# Patient Record
Sex: MALE | Race: WHITE | NOT HISPANIC OR LATINO | Employment: FULL TIME | URBAN - METROPOLITAN AREA
[De-identification: names, ages, dates, MRNs, and addresses within clinical notes are randomized per-mention and may not be internally consistent; named-entity substitution may affect disease eponyms.]

---

## 2018-12-06 NOTE — PROGRESS NOTES
SUBJECTIVE:   CC: Robson Samuel is an 28 year old male who presents for preventive health visit. He is a new patient to our clinic and has not been receiving regular care. His last CPE was many years ago. He has the following concerns they would like addressed today:    Jj is a new patient to us.  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  Life is going well for him and he is happy. His mom is a nephrologist in [place].    1. Jj requests routine STD screening. He has no concerns or symptoms and would like screening.       Healthy Habits:    Do you get at least three servings of calcium containing foods daily (dairy, green leafy vegetables, etc.)? yes    Amount of exercise or daily activities, outside of work: daily yoga for 60 minutes.  Teaches yoga sometimes twice daily.    Problems taking medications regularly not applicable    Medication side effects: No    Have you had an eye exam in the past two years? yes    Do you see a dentist twice per year? yes    Do you have sleep apnea, excessive snoring or daytime drowsiness?no    Today's PHQ-2 Score:   PHQ-2 ( 1999 Pfizer) 12/11/2018   Q1: Little interest or pleasure in doing things 0   Q2: Feeling down, depressed or hopeless 0   PHQ-2 Score 0       There are no active problems to display for this patient.      Past Medical History:   Diagnosis Date     Syncope     One episode       Past Surgical History:   Procedure Laterality Date     wisdom teeth         Family History   Problem Relation Age of Onset     Multiple Sclerosis Mother      Asthma Sister      Heart block Maternal Grandfather         pacemaker     Social History     Socioeconomic History     Marital status: Single     Spouse name: None     Number of children: None     Years of education: None     Highest education level: None   Social Needs     Financial resource strain: None     Food insecurity - worry: None     Food insecurity - inability: None     Transportation needs - medical:  No     Transportation needs - non-medical: No   Occupational History     Occupation: assistant [employer]     Comment: [employer]     Occupation: Teacher     Comment: yoga   Tobacco Use     Smoking status: Never Smoker     Smokeless tobacco: Never Used   Substance and Sexual Activity     Alcohol use: Yes     Alcohol/week: 12.0 oz     Types: 20 Standard drinks or equivalent per week     Frequency: 4 or more times a week     Drinks per session: 3 or 4     Binge frequency: Monthly     Drug use: Yes     Types: Marijuana     Comment: Once per month     Sexual activity: Yes     Partners: Female     Birth control/protection: Condom   Other Topics Concern     None   Social History Narrative    Lives with friend and [place] lopes puppy.    Life is generally going well.        Has a good support system. From [place].  Family is in [place] and supportive.    Feels safe in all environments.     Wears seatbelt 100% of the time.    Wears helmet while biking.    Denies history of abuse, past or present, physical, sexual or emotional.    12/11/18    Zonia Charles PA-C                 No current outpatient medications on file.       Reviewed orders with patient. Reviewed health maintenance and updated orders accordingly - Yes      Reviewed and updated as needed this visit by clinical staff  Tobacco  Allergies  Meds  Problems  Med Hx  Surg Hx  Fam Hx  Soc Hx          Reviewed and updated as needed this visit by Provider  Tobacco  Allergies  Meds  Problems  Med Hx  Surg Hx  Fam Hx  Soc Hx             ROS:  CONSTITUTIONAL: NEGATIVE for fever, chills, change in weight  INTEGUMENTARY/SKIN: NEGATIVE for worrisome rashes, moles or lesions  EYES: NEGATIVE for vision changes or irritation  ENT: NEGATIVE for ear, mouth and throat problems  RESP: NEGATIVE for significant cough or SOB  CV: NEGATIVE for chest pain, palpitations or peripheral edema  GI: NEGATIVE for nausea, abdominal pain, heartburn, or change in bowel habits   male:  "negative for dysuria, hematuria, decreased urinary stream, erectile dysfunction, urethral discharge  MUSCULOSKELETAL: NEGATIVE for significant arthralgias or myalgia  NEURO: NEGATIVE for weakness, dizziness or paresthesias  PSYCHIATRIC: NEGATIVE for changes in mood or affect    OBJECTIVE:   /83 (BP Location: Right arm, Patient Position: Chair, Cuff Size: Adult Regular)   Pulse 71   Temp 97.9  F (36.6  C) (Oral)   Ht 1.803 m (5' 11\")   Wt 79.9 kg (176 lb 1.3 oz)   SpO2 96%   BMI 24.56 kg/m    EXAM:  GENERAL: healthy, alert and no distress  EYES: Eyes grossly normal to inspection, PERRL and conjunctivae and sclerae normal  HENT: ear canals and TM's normal, nose and mouth without ulcers or lesions  NECK: no adenopathy, no asymmetry, masses, or scars and thyroid normal to palpation. No bruits.   RESP: lungs clear to auscultation - no rales, rhonchi or wheezes  CV: regular rate and rhythm, normal S1 S2, no S3 or S4, no murmur, click or rub, no peripheral edema and peripheral pulses strong  ABDOMEN: soft, nontender, no hepatosplenomegaly, no masses and bowel sounds normal  MS: no gross musculoskeletal defects noted, no edema. no clubbing, edema or cyanosis of extremities. Pulses = and appropriate bilaterally to DP and PT  SKIN: no suspicious lesions or rashes  NEURO: Normal strength and tone, mentation intact and speech normal  PSYCH: mentation appears normal, affect normal/bright      ASSESSMENT/PLAN:       ICD-10-CM    1. Routine general medical examination at a health care facility Z00.00 Lipid Panel ([facility])   2. Screening for lipid disorders Z13.220 Lipid Panel ([facility])   3. Need for Tdap vaccination Z23 TDAP VACCINE (BOOSTRIX)     VACCINE ADMINISTRATION, INITIAL   4. Flu vaccine need Z23 C RIV4 (FLUBLOK) VACCINE RECOMBINANT DNA PRSRV ANTIBIO FREE, IM     VACCINE ADMINISTRATION, EACH ADDITIONAL   5. Screen for STD (sexually transmitted disease) Z11.3 HIV Antigen Antibody Combo     Chlamydia trachomatis " "PCR     Neisseria gonorrhoeae PCR     Treponema Abs w Reflex to RPR and Titer         Generally doing very well.  All questions answered.  RTC for CPE in 2 years.  Sooner as needed.  COUNSELING:  Reviewed preventive health counseling, as reflected in patient instructions  Special attention given to:        Regular exercise       Healthy diet/nutrition       Vision screening       Immunizations    Vaccinated for: Influenza and TDAP         Alcohol Use       Safe sex practices/STD prevention       Osteoporosis Prevention/Bone Health    BP Readings from Last 1 Encounters:   12/11/18 131/83     Estimated body mass index is 24.56 kg/m  as calculated from the following:    Height as of this encounter: 1.803 m (5' 11\").    Weight as of this encounter: 79.9 kg (176 lb 1.3 oz).     reports that  has never smoked. he has never used smokeless tobacco.      Counseling Resources:  ATP IV Guidelines  Pooled Cohorts Equation Calculator  FRAX Risk Assessment  ICSI Preventive Guidelines  Dietary Guidelines for Americans, 2010  USDA's MyPlate  ASA Prophylaxis  Lung CA Screening    Steph Charles PA-C  [facility]    I, Yarelis Champagne, am serving as a scribe to document services personally performed by Steph Charles PA-C, based on data collection and the provider's statements to me. Steph Charles PA-C, has reviewed, edited, and approve the above note.     "

## 2018-12-06 NOTE — PATIENT INSTRUCTIONS
Welcome to [facility], where we are committed to the art of inspired primary care.  Thank you for choosing us to be a part of your well-being.    The clinic is open Monday through Friday, 8:00 a.m. - 5:00 p.m., and Saturdays from 8:00 a.m. - 12:00 p.m.  After-hours questions are directed to our 24-hour nurse line, which can be reached by calling the clinic at [phone].  You can also contact the clinic through [facility]ie, our online patient portal.  (Please allow 1-2 business days for a response via Hunie.)  If you are not already enrolled in [facility], access instructions are below.    If you need a refill on your prescription, please contact the pharmacy where you filled it, and they will contact our clinic with the details of what is needed.  If your prescription is a controlled substance, you will have a conversation with your provider to determine if you would like to  your prescription at the clinic or have it mailed to your pharmacy.  Please allow 2-3 business days for all refill requests to be handled.    We look forward to providing you with great care!    Please let me know if you have any questions.  Thank you for allowing me to participate in your care.  It was my pleasure meeting you.  Zonia Charles PA-C        Preventive Health Recommendations  Male Ages 26 - 39    Yearly exam:             See your health care provider every year in order to  o   Review health changes.   o   Discuss preventive care.    o   Review your medicines if your doctor has prescribed any.    You should be tested each year for STDs (sexually transmitted diseases), if you re at risk.     After age 35, talk to your provider about cholesterol testing. If you are at risk for heart disease, have your cholesterol tested at least every 5 years.     If you are at risk for diabetes, you should have a diabetes test (fasting glucose).  Shots: Get a flu shot each year. Get a tetanus shot every 10  years.     Nutrition:    Eat at least 5 servings of fruits and vegetables daily.     Eat whole-grain bread, whole-wheat pasta and brown rice instead of white grains and rice.     Get adequate Calcium and Vitamin D.     Lifestyle    Exercise for at least 150 minutes a week (30 minutes a day, 5 days a week). This will help you control your weight and prevent disease.     Limit alcohol to one drink per day.     No smoking.     Wear sunscreen to prevent skin cancer.     See your dentist every six months for an exam and cleaning.

## 2018-12-11 ENCOUNTER — OFFICE VISIT (OUTPATIENT)
Dept: FAMILY MEDICINE | Facility: CLINIC | Age: 28
End: 2018-12-11
Payer: COMMERCIAL

## 2018-12-11 VITALS
WEIGHT: 176.08 LBS | SYSTOLIC BLOOD PRESSURE: 131 MMHG | BODY MASS INDEX: 24.65 KG/M2 | OXYGEN SATURATION: 96 % | TEMPERATURE: 97.9 F | HEART RATE: 71 BPM | DIASTOLIC BLOOD PRESSURE: 83 MMHG | HEIGHT: 71 IN

## 2018-12-11 DIAGNOSIS — Z23 FLU VACCINE NEED: ICD-10-CM

## 2018-12-11 DIAGNOSIS — Z13.220 SCREENING FOR LIPID DISORDERS: ICD-10-CM

## 2018-12-11 DIAGNOSIS — Z00.00 ROUTINE GENERAL MEDICAL EXAMINATION AT A HEALTH CARE FACILITY: Primary | ICD-10-CM

## 2018-12-11 DIAGNOSIS — Z23 NEED FOR TDAP VACCINATION: ICD-10-CM

## 2018-12-11 DIAGNOSIS — Z11.3 SCREEN FOR STD (SEXUALLY TRANSMITTED DISEASE): ICD-10-CM

## 2018-12-11 LAB
CHOLEST SERPL-MCNC: 213 MG/DL (ref 0–200)
CHOLEST/HDLC SERPL: 2.4 {RATIO} (ref 0–5)
FASTING SPECIMEN: NO
HDLC SERPL-MCNC: 88 MG/DL
LDLC SERPL CALC-MCNC: 96 MG/DL (ref 0–129)
TRIGL SERPL-MCNC: 145 MG/DL (ref 0–150)
VLDL-CHOLESTEROL: 29 (ref 7–32)

## 2018-12-11 SDOH — HEALTH STABILITY: MENTAL HEALTH: HOW MANY STANDARD DRINKS CONTAINING ALCOHOL DO YOU HAVE ON A TYPICAL DAY?: 3 OR 4

## 2018-12-11 SDOH — HEALTH STABILITY: PHYSICAL HEALTH: ON AVERAGE, HOW MANY DAYS PER WEEK DO YOU ENGAGE IN MODERATE TO STRENUOUS EXERCISE (LIKE A BRISK WALK)?: 7 DAYS

## 2018-12-11 SDOH — ECONOMIC STABILITY: TRANSPORTATION INSECURITY
IN THE PAST 12 MONTHS, HAS THE LACK OF TRANSPORTATION KEPT YOU FROM MEDICAL APPOINTMENTS OR FROM GETTING MEDICATIONS?: NO

## 2018-12-11 SDOH — HEALTH STABILITY: MENTAL HEALTH
STRESS IS WHEN SOMEONE FEELS TENSE, NERVOUS, ANXIOUS, OR CAN'T SLEEP AT NIGHT BECAUSE THEIR MIND IS TROUBLED. HOW STRESSED ARE YOU?: ONLY A LITTLE

## 2018-12-11 SDOH — HEALTH STABILITY: PHYSICAL HEALTH: ON AVERAGE, HOW MANY MINUTES DO YOU ENGAGE IN EXERCISE AT THIS LEVEL?: 60 MIN

## 2018-12-11 SDOH — HEALTH STABILITY: MENTAL HEALTH: HOW OFTEN DO YOU HAVE 6 OR MORE DRINKS ON ONE OCCASION?: MONTHLY

## 2018-12-11 SDOH — HEALTH STABILITY: MENTAL HEALTH: HOW OFTEN DO YOU HAVE A DRINK CONTAINING ALCOHOL?: 4 OR MORE TIMES A WEEK

## 2018-12-11 SDOH — ECONOMIC STABILITY: TRANSPORTATION INSECURITY
IN THE PAST 12 MONTHS, HAS LACK OF TRANSPORTATION KEPT YOU FROM MEETINGS, WORK, OR FROM GETTING THINGS NEEDED FOR DAILY LIVING?: NO

## 2018-12-11 ASSESSMENT — MIFFLIN-ST. JEOR: SCORE: 1800.82

## 2018-12-11 ASSESSMENT — PAIN SCALES - GENERAL: PAINLEVEL: NO PAIN (0)

## 2018-12-11 NOTE — NURSING NOTE
Screening Questionnaire for Adult Immunization    Are you sick today?   No   Do you have allergies to medications, food, a vaccine component or latex?   No   Have you ever had a serious reaction after receiving a vaccination?   No   Do you have a long-term health problem with heart disease, lung disease, asthma, kidney disease, metabolic disease (e.g. diabetes), anemia, or other blood disorder?   No   Do you have cancer, leukemia, HIV/AIDS, or any other immune system problem?   No   In the past 3 months, have you taken medications that affect  your immune system, such as prednisone, other steroids, or anticancer drugs; drugs for the treatment of rheumatoid arthritis, Crohn s disease, or psoriasis; or have you had radiation treatments?   No   Have you had a seizure, or a brain or other nervous system problem?   No   During the past year, have you received a transfusion of blood or blood     products, or been given immune (gamma) globulin or antiviral drug?   No   For women: Are you pregnant or is there a chance you could become        pregnant during the next month?   No   Have you received any vaccinations in the past 4 weeks?   No     Immunization questionnaire answers were all negative.        Given by Mary Lou Tsai. Patient instructed to remain in clinic for 15 minutes afterwards, and to report any adverse reaction to me immediately.       Screening performed by Mary Lou Tsai on 12/11/2018 at 9:36 AM.    Injectable Influenza Immunization Documentation    1.  Has the patient received the information for the injectable influenza vaccine? YES     2. Is the patient 6 months of age or older? YES     3. Does the patient have any of the following contraindications?         Severe allergy to eggs? No     Severe allergic reaction to previous influenza vaccines? No   Severe allergy to latex? No       History of Guillain-Greenwald syndrome? No     Currently have a temperature greater than 100.4F? No        4.  Severely  "egg allergic patients should have flu vaccine eligibility assessed by an MD, RN, or pharmacist, and those who received flu vaccine should be observed for 15 min by an MD, RN, Pharmacist, Medical Technician, or member of clinic staff.\": YES    5. Latex-allergic patients should be given latex-free influenza vaccine Yes. Please reference the Vaccine latex table to determine if your clinic s product is latex-containing.       Vaccination given by HANK Gonzales          "

## 2018-12-11 NOTE — NURSING NOTE
"28 year old  Chief Complaint   Patient presents with     [facility]     Physical     STD     screening.       Blood pressure 131/83, pulse 71, temperature 97.9  F (36.6  C), temperature source Oral, height 1.803 m (5' 11\"), weight 79.9 kg (176 lb 1.3 oz), SpO2 96 %. Body mass index is 24.56 kg/m .  There is no problem list on file for this patient.      Wt Readings from Last 2 Encounters:   12/11/18 79.9 kg (176 lb 1.3 oz)   12/31/16 74.4 kg (164 lb)     BP Readings from Last 3 Encounters:   12/11/18 131/83   12/31/16 115/72         No current outpatient medications on file.     No current facility-administered medications for this visit.        Social History     Tobacco Use     Smoking status: Never Smoker     Smokeless tobacco: Never Used   Substance Use Topics     Alcohol use: Yes     Alcohol/week: 0.0 oz     Drug use: Yes     Types: Marijuana       Health Maintenance Due   Topic Date Due     DTAP/TDAP/TD IMMUNIZATION (1 - Tdap) 02/09/1999     PHQ-2 Q1 YR  02/09/2004     HIV SCREEN (SYSTEM ASSIGNED)  02/09/2010     INFLUENZA VACCINE (1) 09/01/2018       No results found for: HELIO Vines CMA  December 11, 2018 8:05 AM  "

## 2018-12-12 LAB
C TRACH DNA SPEC QL NAA+PROBE: NEGATIVE
HIV 1+2 AB+HIV1 P24 AG SERPL QL IA: NONREACTIVE
N GONORRHOEA DNA SPEC QL NAA+PROBE: NEGATIVE
SPECIMEN SOURCE: NORMAL
SPECIMEN SOURCE: NORMAL
T PALLIDUM AB SER QL: NONREACTIVE

## 2019-12-17 NOTE — PROGRESS NOTES
SUBJECTIVE:   CC: Jj Samuel is an 29 year old male who presents for preventive health visit.     Jj is wondering if he can have his liver function tests checked today. 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 He is not concerned about alcohol abuse. Negative CAGE score today.    Elevated blood pressure without diagnosis of hypertension. His blood pressure is elevated today. No history of hypertension.     BP Readings from Last 3 Encounters:   12/18/19 (!) 146/74   12/11/18 131/83   12/31/16 115/72     Healthy Habits:    Do you get at least three servings of calcium containing foods daily (dairy, green leafy vegetables, etc.)? Yes    Amount of exercise or daily activities, outside of work: Exercises regularly and teaches yoga.     Problems taking medications regularly not applicable    Medication side effects: No    Have you had an eye exam in the past two years? yes    Do you see a dentist twice per year? yes    Do you have sleep apnea, excessive snoring or daytime drowsiness? no    Today's PHQ-2 Score:   PHQ-2 ( 1999 Pfizer) 12/18/2019 12/11/2018   Q1: Little interest or pleasure in doing things 0 0   Q2: Feeling down, depressed or hopeless 0 0   PHQ-2 Score 0 0       Social History     Tobacco Use     Smoking status: Never Smoker     Smokeless tobacco: Never Used   Substance Use Topics     Alcohol use: Yes     Alcohol/week: 16.0 standard drinks     Types: 16 Standard drinks or equivalent per week     Frequency: 4 or more times a week     Drinks per session: 3 or 4     Binge frequency: Monthly     Comment: pt drinks beer every day      If you drink alcohol do you typically have >3 drinks per day or >7 drinks per week? Yes - Negative CAGE score                     Reviewed orders with patient. Reviewed health maintenance and updated orders accordingly - Yes    Reviewed and updated as needed this visit by clinical staff  Tobacco  Allergies  Meds  Med Hx  Surg Hx  Fam Hx  Soc  "Hx        Reviewed and updated as needed this visit by Provider  Tobacco  Med Hx  Surg Hx  Fam Hx  Soc Hx       There are no active problems to display for this patient.      Past Medical History:   Diagnosis Date     Syncope     One episode       Past Surgical History:   Procedure Laterality Date     wisdom teeth         Family History   Problem Relation Age of Onset     Multiple Sclerosis Mother      Asthma Sister      Heart block Maternal Grandfather         pacemaker       Social History     Social History Narrative    Lives with friend and Portuguese lopes puppy.    Life is generally going well.        Has a good support system. From [place].  Family is in [place] and supportive.    Feels safe in all environments.     Wears seatbelt 100% of the time.    Wears helmet while biking.    Denies history of abuse, past or present, physical, sexual or emotional.    12/11/18    Zonia Charlse PA-C        Lives with roommate. Puppy is doing well.  Work is going well.    12/18/19    No change updated.               No current outpatient medications on file.       ROS:  CONSTITUTIONAL: NEGATIVE for fever, chills, change in weight  INTEGUMENTARY/SKIN: NEGATIVE for worrisome rashes, moles or lesions  EYES: NEGATIVE for vision changes or irritation  ENT: NEGATIVE for ear, mouth and throat problems  RESP: NEGATIVE for significant cough or SOB  CV: NEGATIVE for chest pain, palpitations or peripheral edema  GI: NEGATIVE for nausea, abdominal pain, heartburn, or change in bowel habits   male: negative for dysuria, hematuria, decreased urinary stream, erectile dysfunction, urethral discharge  MUSCULOSKELETAL: NEGATIVE for significant arthralgias or myalgia  NEURO: NEGATIVE for weakness, dizziness or paresthesias  PSYCHIATRIC: NEGATIVE for changes in mood or affect    OBJECTIVE:   BP (!) 146/74   Pulse 53   Temp 97.6  F (36.4  C) (Oral)   Resp 16   Ht 1.8 m (5' 10.87\")   Wt 77.8 kg (171 lb 8 oz)   SpO2 100%   BMI 24.01 kg/m  " "  EXAM:  GENERAL: healthy, alert and no distress  EYES: Eyes grossly normal to inspection, PERRL and conjunctivae and sclerae normal  HENT: ear canals and TM's normal, nose and mouth without ulcers or lesions  NECK: no adenopathy, no asymmetry, masses, or scars and thyroid normal to palpation. No carotid bruits.   RESP: lungs clear to auscultation - no rales, rhonchi or wheezes  CV: regular rate and rhythm, normal S1 S2, no S3 or S4, no murmur, click or rub, no peripheral edema and peripheral pulses strong  ABDOMEN: soft, nontender, no hepatosplenomegaly, no masses and bowel sounds normal  MS: no gross musculoskeletal defects noted, no clubbing, edema or cyanosis of extremities. Pulses = and appropriate bilaterally to DP and PT  SKIN: no suspicious lesions or rashes  NEURO: Normal strength and tone, mentation intact and speech normal  PSYCH: mentation appears normal, affect normal/bright    ASSESSMENT/PLAN:       ICD-10-CM    1. Routine general medical examination at a health care facility Z00.00    2. Screening for lipid disorders Z13.220 Lipid Panel ([place])     CANCELED: Lipid Panel ([facility])   3. Flu vaccine need Z23 C RIV4 (FLUBLOK) VACCINE RECOMBINANT DNA PRSRV ANTIBIO FREE, IM     ADMIN INFLUENZA VIRUS VACCINE   4. Alcohol use Z72.89 GGT     Hepatic panel     CANCELED: GGT     CANCELED: Hepatic panel       COUNSELING:  Reviewed preventive health counseling, as reflected in patient instructions  Special attention given to:        Regular exercise       Healthy diet/nutrition       Immunizations    Vaccinated for: Influenza         Alcohol Use       Osteoporosis Prevention/Bone Health    Estimated body mass index is 24.01 kg/m  as calculated from the following:    Height as of this encounter: 1.8 m (5' 10.87\").    Weight as of this encounter: 77.8 kg (171 lb 8 oz).     reports that he has never smoked. He has never used smokeless tobacco.    Counseling Resources:  ATP IV Guidelines  Pooled Cohorts Equation " Calculator  FRAX Risk Assessment  ICSI Preventive Guidelines  Dietary Guidelines for Americans, 2010  USDA's MyPlate  ASA Prophylaxis  Lung CA Screening    Steph Charles PA-C  [facility]    IAsad am serving as a scribe to document services personally performed by Steph Charles PA-C, based on data collection and the provider's statements to me. Steph Charles PA-C, has reviewed, edited, and approved the above note.

## 2019-12-17 NOTE — PATIENT INSTRUCTIONS
Preventive Health Recommendations  Male Ages 26 - 39    Yearly exam:             See your health care provider every year in order to  o   Review health changes.   o   Discuss preventive care.    o   Review your medicines if your doctor has prescribed any.    You should be tested each year for STDs (sexually transmitted diseases), if you re at risk.     After age 35, talk to your provider about cholesterol testing. If you are at risk for heart disease, have your cholesterol tested at least every 5 years.     If you are at risk for diabetes, you should have a diabetes test (fasting glucose).  Shots: Get a flu shot each year. Get a tetanus shot every 10 years.     Nutrition:    Eat at least 5 servings of fruits and vegetables daily.     Eat whole-grain bread, whole-wheat pasta and brown rice instead of white grains and rice.     Get adequate Calcium and Vitamin D.     Lifestyle    Exercise for at least 150 minutes a week (30 minutes a day, 5 days a week). This will help you control your weight and prevent disease.     Limit alcohol to one drink per day.     No smoking.     Wear sunscreen to prevent skin cancer.     See your dentist every six months for an exam and cleaning.   The CAGE screening questions (asking whether patients felt they should cut down on drinking, were annoyed by others criticizing his drinking, felt guilty about use, or ever had an eye opener) were asked of the patient to determine possible ETOH or chemical abuse issues.   His positive answers are as follows.    Have you ever:  C    Patient felt they ought to CUT down on your drinking (or drug use).

## 2019-12-18 ENCOUNTER — OFFICE VISIT (OUTPATIENT)
Dept: FAMILY MEDICINE | Facility: CLINIC | Age: 29
End: 2019-12-18
Payer: COMMERCIAL

## 2019-12-18 ENCOUNTER — TELEPHONE (OUTPATIENT)
Dept: FAMILY MEDICINE | Facility: CLINIC | Age: 29
End: 2019-12-18

## 2019-12-18 VITALS
BODY MASS INDEX: 24.01 KG/M2 | WEIGHT: 171.5 LBS | DIASTOLIC BLOOD PRESSURE: 74 MMHG | HEIGHT: 71 IN | RESPIRATION RATE: 16 BRPM | TEMPERATURE: 97.6 F | OXYGEN SATURATION: 100 % | SYSTOLIC BLOOD PRESSURE: 146 MMHG | HEART RATE: 53 BPM

## 2019-12-18 DIAGNOSIS — Z13.220 SCREENING FOR LIPID DISORDERS: ICD-10-CM

## 2019-12-18 DIAGNOSIS — Z78.9 ALCOHOL USE: ICD-10-CM

## 2019-12-18 DIAGNOSIS — Z23 FLU VACCINE NEED: ICD-10-CM

## 2019-12-18 DIAGNOSIS — Z00.00 ROUTINE GENERAL MEDICAL EXAMINATION AT A HEALTH CARE FACILITY: Primary | ICD-10-CM

## 2019-12-18 SDOH — SOCIAL STABILITY: SOCIAL INSECURITY
WITHIN THE LAST YEAR, HAVE TO BEEN RAPED OR FORCED TO HAVE ANY KIND OF SEXUAL ACTIVITY BY YOUR PARTNER OR EX-PARTNER?: NO

## 2019-12-18 SDOH — SOCIAL STABILITY: SOCIAL INSECURITY: WITHIN THE LAST YEAR, HAVE YOU BEEN HUMILIATED OR EMOTIONALLY ABUSED IN OTHER WAYS BY YOUR PARTNER OR EX-PARTNER?: NO

## 2019-12-18 SDOH — ECONOMIC STABILITY: INCOME INSECURITY: HOW HARD IS IT FOR YOU TO PAY FOR THE VERY BASICS LIKE FOOD, HOUSING, MEDICAL CARE, AND HEATING?: NOT HARD AT ALL

## 2019-12-18 SDOH — ECONOMIC STABILITY: FOOD INSECURITY: WITHIN THE PAST 12 MONTHS, YOU WORRIED THAT YOUR FOOD WOULD RUN OUT BEFORE YOU GOT MONEY TO BUY MORE.: NEVER TRUE

## 2019-12-18 SDOH — SOCIAL STABILITY: SOCIAL INSECURITY
WITHIN THE LAST YEAR, HAVE YOU BEEN KICKED, HIT, SLAPPED, OR OTHERWISE PHYSICALLY HURT BY YOUR PARTNER OR EX-PARTNER?: NO

## 2019-12-18 SDOH — SOCIAL STABILITY: SOCIAL INSECURITY: WITHIN THE LAST YEAR, HAVE YOU BEEN AFRAID OF YOUR PARTNER OR EX-PARTNER?: NO

## 2019-12-18 SDOH — ECONOMIC STABILITY: FOOD INSECURITY: WITHIN THE PAST 12 MONTHS, THE FOOD YOU BOUGHT JUST DIDN'T LAST AND YOU DIDN'T HAVE MONEY TO GET MORE.: NEVER TRUE

## 2019-12-18 ASSESSMENT — MIFFLIN-ST. JEOR: SCORE: 1772.92

## 2019-12-18 NOTE — TELEPHONE ENCOUNTER
Health Call Center    Phone Message    May a detailed message be left on voicemail: yes    Reason for Call: Other: Jj calling to decline the lab work that Steph Charles was asking him to do after speaking with his insurance company.  This is an FYI and he is not requesting a call back       Action Taken: Message routed to:  [facility]: [facility]

## 2019-12-18 NOTE — NURSING NOTE
"29 year old  Chief Complaint   Patient presents with     Physical     pt is wondering about having his liver tested.        Blood pressure (!) 146/74, pulse 53, temperature 97.6  F (36.4  C), temperature source Oral, resp. rate 16, height 1.8 m (5' 10.87\"), weight 77.8 kg (171 lb 8 oz), SpO2 100 %. Body mass index is 24.01 kg/m .  There is no problem list on file for this patient.      Wt Readings from Last 2 Encounters:   12/18/19 77.8 kg (171 lb 8 oz)   12/11/18 79.9 kg (176 lb 1.3 oz)     BP Readings from Last 3 Encounters:   12/18/19 (!) 146/74   12/11/18 131/83   12/31/16 115/72         No current outpatient medications on file.     No current facility-administered medications for this visit.        Social History     Tobacco Use     Smoking status: Never Smoker     Smokeless tobacco: Never Used   Substance Use Topics     Alcohol use: Yes     Alcohol/week: 20.0 standard drinks     Types: 20 Standard drinks or equivalent per week     Frequency: 4 or more times a week     Drinks per session: 3 or 4     Binge frequency: Monthly     Comment: pt drinks beer every day      Drug use: Yes     Types: Marijuana     Comment: Once per month       Health Maintenance Due   Topic Date Due     PHQ-2  01/01/2019     INFLUENZA VACCINE (1) 09/01/2019     PREVENTIVE CARE VISIT  12/11/2019       No results found for: PAP      December 18, 2019 8:50 AM  "

## 2020-04-01 LAB — HEP C HIM: NORMAL

## 2021-01-05 NOTE — PROGRESS NOTES
3  SUBJECTIVE:   CC: Robson Samuel is an 30 year old male who presents for preventive health visit. Last CPE 12/2019. Jj is generally healthy. He has the following additional concerns addressed today:    He has a history of excessive alcohol use. 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 Otherwise is very health with diet and exercise and weight management.  Exercises 5 days per week.  Yoga and plyometrics.    Healthy Habits:     Do you get at least three servings of calcium containing foods daily (dairy, green leafy vegetables, etc.)? No--reviewed recommendations    Amount of exercise or daily activities, outside of work: 5 day(s) per week    Problems taking medications regularly not applicable    Medication side effects: No    Have you had an eye exam in the past two years? no    Do you see a dentist twice per year? yes    Do you have sleep apnea, excessive snoring or daytime drowsiness?no    STD testing offered--no concerns      Today's PHQ-2 Score:   PHQ-2 ( 1999 Pfizer) 1/6/2021 12/18/2019   Q1: Little interest or pleasure in doing things 0 0   Q2: Feeling down, depressed or hopeless 0 0   PHQ-2 Score 0 0       Patient Active Problem List    Diagnosis Date Noted     Alcohol dependence (H) 01/06/2021     Priority: Medium       Past Medical History:   Diagnosis Date     Syncope     One episode       Past Surgical History:   Procedure Laterality Date     wisdom teeth         Family History   Problem Relation Age of Onset     Multiple Sclerosis Mother         Doing well     Asthma Sister      Heart block Maternal Grandfather         pacemaker       Social History     Tobacco Use     Smoking status: Never Smoker     Smokeless tobacco: Never Used   Substance Use Topics     Alcohol use: Yes     Alcohol/week: 21.0 standard drinks     Types: 21 Standard drinks or equivalent per week     Frequency: 4 or more times a week     Drinks per session: 3 or 4     Binge frequency: Less than monthly     Comment:  pt drinks beer every day        Social History     Social History Narrative    Lives with friend and [place] puppy.    Life is generally going well.        Has a good support system. From [place].  Family is in [place] and supportive.    Feels safe in all environments.     Wears seatbelt 100% of the time.    Wears helmet while biking.    Denies history of abuse, past or present, physical, sexual or emotional.    12/11/18    Zonia Charles PA-C        Lives with roommate. Puppy is doing well.  Work is going well. In a relationship.Life is going OK despite COVID.    01/06/21    No concerns.               No current outpatient medications on file.       Reviewed orders with patient. Reviewed health maintenance and updated orders accordingly - Yes    Reviewed and updated as needed this visit by clinical staff  Tobacco  Allergies  Meds  Problems  Med Hx  Surg Hx  Fam Hx  Soc Hx          Reviewed and updated as needed this visit by Provider  Tobacco  Allergies  Meds  Problems  Med Hx  Surg Hx  Fam Hx  Soc Hx           ROS:  CONSTITUTIONAL: NEGATIVE for fever, chills, change in weight  INTEGUMENTARY/SKIN: NEGATIVE for worrisome rashes, moles or lesions  EYES: NEGATIVE for vision changes or irritation  ENT: NEGATIVE for ear, mouth and throat problems  RESP: NEGATIVE for significant cough or SOB  CV: NEGATIVE for chest pain, palpitations or peripheral edema  GI: NEGATIVE for nausea, abdominal pain, heartburn, or change in bowel habits   male: negative for dysuria, hematuria, decreased urinary stream, erectile dysfunction, urethral discharge  MUSCULOSKELETAL: NEGATIVE for significant arthralgias or myalgia  NEURO: NEGATIVE for weakness, dizziness or paresthesias  ENDOCRINE: NEGATIVE for temperature intolerance, skin/hair changes  HEME/ALLERGY/IMMUNE: NEGATIVE for bleeding problems  PSYCHIATRIC: NEGATIVE for changes in mood or affect    OBJECTIVE:   /74   Pulse 76   Temp 97.2  F (36.2  C)   Resp 14   " Ht 1.804 m (5' 11.02\")   Wt 79.5 kg (175 lb 4 oz)   SpO2 96%   BMI 24.43 kg/m    EXAM:  GENERAL: healthy, alert and no distress  EYES: Eyes grossly normal to inspection, PERRL and conjunctivae and sclerae normal  HENT: ear canals and TM's normal, nose and mouth without ulcers or lesions  NECK: no adenopathy, no asymmetry, masses, or scars and thyroid normal to palpation. No bruits  RESP: lungs clear to auscultation - no rales, rhonchi or wheezes  CV: regular rate and rhythm, normal S1 S2, no S3 or S4, no murmur, click or rub, no peripheral edema and peripheral pulses strong  ABDOMEN: soft, nontender, no hepatosplenomegaly, no masses and bowel sounds normal  MS: no gross musculoskeletal defects noted, no clubbing, edema or cyanosis of extremities.  Pulses = and appropriate bilaterally to DP and PT  SKIN: no suspicious lesions or rashes  NEURO: Normal strength and tone, mentation intact and speech normal  PSYCH: mentation appears normal, affect normal/bright  LYMPH: no cervical, supraclavicular, axillary, or inguinal adenopathy      ASSESSMENT/PLAN:   Robson was seen today for physical.    Diagnoses and all orders for this visit:    Routine general medical examination at a health care facility  -     Comprehensive metabolic panel  -     GGT  Generally health see recommendations below.    Uncomplicated alcohol dependence (H)  Comments:  Works as a lyman.  Drinks for work as well as socially. CAGE score negative. Monitoring.  Recommended taking alcohol break on weekends and consider stopping for a month and limiting alcohol outside of work.  Orders:  -     Comprehensive metabolic panel  -     GGT    Screening for lipid disorders  -     Lipid Panel ([facility])        COUNSELING:  Reviewed preventive health counseling, as reflected in patient instructions    Estimated body mass index is 24.43 kg/m  as calculated from the following:    Height as of this encounter: 1.804 m (5' 11.02\").    Weight as of this encounter: " 79.5 kg (175 lb 4 oz).        He reports that he has never smoked. He has never used smokeless tobacco.      Counseling Resources:  ATP IV Guidelines  Pooled Cohorts Equation Calculator  FRAX Risk Assessment  ICSI Preventive Guidelines  Dietary Guidelines for Americans, 2010  USDA's MyPlate  ASA Prophylaxis  Lung CA Screening    Steph Charles PA-C  [facility]

## 2021-01-06 ENCOUNTER — OFFICE VISIT (OUTPATIENT)
Dept: FAMILY MEDICINE | Facility: CLINIC | Age: 31
End: 2021-01-06
Payer: COMMERCIAL

## 2021-01-06 VITALS
TEMPERATURE: 97.2 F | HEIGHT: 71 IN | WEIGHT: 175.25 LBS | DIASTOLIC BLOOD PRESSURE: 74 MMHG | HEART RATE: 76 BPM | RESPIRATION RATE: 14 BRPM | BODY MASS INDEX: 24.53 KG/M2 | SYSTOLIC BLOOD PRESSURE: 137 MMHG | OXYGEN SATURATION: 96 %

## 2021-01-06 DIAGNOSIS — Z00.00 ROUTINE GENERAL MEDICAL EXAMINATION AT A HEALTH CARE FACILITY: Primary | ICD-10-CM

## 2021-01-06 DIAGNOSIS — F10.20 UNCOMPLICATED ALCOHOL DEPENDENCE (H): ICD-10-CM

## 2021-01-06 DIAGNOSIS — Z13.220 SCREENING FOR LIPID DISORDERS: ICD-10-CM

## 2021-01-06 DIAGNOSIS — R17 ELEVATED BILIRUBIN: ICD-10-CM

## 2021-01-06 LAB
ALBUMIN SERPL-MCNC: 4.5 G/DL (ref 3.4–5)
ALP SERPL-CCNC: 48 U/L (ref 40–150)
ALT SERPL W P-5'-P-CCNC: 27 U/L (ref 0–70)
ANION GAP SERPL CALCULATED.3IONS-SCNC: 4 MMOL/L (ref 3–14)
AST SERPL W P-5'-P-CCNC: 22 U/L (ref 0–45)
BILIRUB SERPL-MCNC: 1.5 MG/DL (ref 0.2–1.3)
BUN SERPL-MCNC: 14 MG/DL (ref 7–30)
CALCIUM SERPL-MCNC: 9.7 MG/DL (ref 8.5–10.1)
CHLORIDE SERPL-SCNC: 101 MMOL/L (ref 94–109)
CHOLEST SERPL-MCNC: 263 MG/DL (ref 0–200)
CHOLEST/HDLC SERPL: 2.7 {RATIO} (ref 0–5)
CO2 SERPL-SCNC: 31 MMOL/L (ref 20–32)
CREAT SERPL-MCNC: 0.93 MG/DL (ref 0.66–1.25)
FASTING SPECIMEN: YES
GFR SERPL CREATININE-BSD FRML MDRD: >90 ML/MIN/{1.73_M2}
GGT SERPL-CCNC: 19 U/L (ref 0–75)
GLUCOSE SERPL-MCNC: 79 MG/DL (ref 70–99)
HDLC SERPL-MCNC: 97 MG/DL
LDLC SERPL CALC-MCNC: 149 MG/DL (ref 0–129)
POTASSIUM SERPL-SCNC: 5.1 MMOL/L (ref 3.4–5.3)
PROT SERPL-MCNC: 7.9 G/DL (ref 6.8–8.8)
SODIUM SERPL-SCNC: 136 MMOL/L (ref 133–144)
TRIGL SERPL-MCNC: 85 MG/DL (ref 0–150)
VLDL-CHOLESTEROL: 17 (ref 7–32)

## 2021-01-06 SDOH — HEALTH STABILITY: MENTAL HEALTH: HOW OFTEN DO YOU HAVE 6 OR MORE DRINKS ON ONE OCCASION?: LESS THAN MONTHLY

## 2021-01-06 ASSESSMENT — MIFFLIN-ST. JEOR: SCORE: 1787.44

## 2021-01-06 NOTE — NURSING NOTE
"30 year old  Chief Complaint   Patient presents with     Physical     pt drinks alot of beer and is wondering about a liver test,       Blood pressure 137/74, pulse 76, temperature 97.2  F (36.2  C), resp. rate 14, height 1.804 m (5' 11.02\"), weight 79.5 kg (175 lb 4 oz), SpO2 96 %. Body mass index is 24.43 kg/m .  There is no problem list on file for this patient.      Wt Readings from Last 2 Encounters:   01/06/21 79.5 kg (175 lb 4 oz)   12/18/19 77.8 kg (171 lb 8 oz)     BP Readings from Last 3 Encounters:   01/06/21 137/74   12/18/19 (!) 146/74   12/11/18 131/83         No current outpatient medications on file.     No current facility-administered medications for this visit.        Social History     Tobacco Use     Smoking status: Never Smoker     Smokeless tobacco: Never Used   Substance Use Topics     Alcohol use: Yes     Alcohol/week: 16.0 standard drinks     Types: 16 Standard drinks or equivalent per week     Frequency: 4 or more times a week     Drinks per session: 3 or 4     Binge frequency: Monthly     Comment: pt drinks beer every day      Drug use: Yes     Types: Marijuana     Comment: Once per month       Health Maintenance Due   Topic Date Due     HEPATITIS C SCREENING  02/09/2010     PHQ-2  01/01/2021       No results found for: PAP      January 6, 2021 7:58 AM  "

## 2021-01-07 LAB — BILIRUB DIRECT SERPL-MCNC: 0.4 MG/DL (ref 0–0.2)

## 2021-01-29 DIAGNOSIS — R17 ELEVATED BILIRUBIN: ICD-10-CM

## 2021-01-29 LAB
ALBUMIN SERPL-MCNC: 4.1 G/DL (ref 3.4–5)
ALP SERPL-CCNC: 46 U/L (ref 40–150)
ALT SERPL W P-5'-P-CCNC: 30 U/L (ref 0–70)
AST SERPL W P-5'-P-CCNC: 32 U/L (ref 0–45)
BILIRUB DIRECT SERPL-MCNC: 0.4 MG/DL (ref 0–0.2)
BILIRUB SERPL-MCNC: 1.4 MG/DL (ref 0.2–1.3)
PROT SERPL-MCNC: 7.1 G/DL (ref 6.8–8.8)

## 2021-02-12 ENCOUNTER — TELEPHONE (OUTPATIENT)
Dept: FAMILY MEDICINE | Facility: CLINIC | Age: 31
End: 2021-02-12

## 2021-02-12 NOTE — TELEPHONE ENCOUNTER
Jj is calling to get an estimate on what he'll be charged for his labs (1/29/21). Provided him with EDUARDO Goins's number to further assist.     [place]

## 2022-01-12 NOTE — PROGRESS NOTES
ASSESSMENT AND PLAN:     COUNSELING:   Reviewed preventive health counseling, as reflected in patient instructions       Alcohol Use        HIV screeninx in teen years, 1x in adult years, and at intervals if high risk    (Z00.00) Visit for well man health check  (primary encounter diagnosis)  Comment: Age appropriate screening and preventive services provided.   Plan:     (R17) Elevated bilirubin  Comment: Very mild elevation of direct and total bilirubin found last year. Update CMP today, check CBC both for platelets given alcohol use and for anemia. If bilirubin more elevated, would consider finishing rule out for hemolysis with retic count and smear.   Plan: Comprehensive metabolic panel, CBC with         platelets          (E78.00) High cholesterol  Comment: We discussed genetic and lifestyle factors that can contribute. Update labs.   Plan: Lipid panel reflex to direct LDL Fasting          (Z11.4) Screening for HIV (human immunodeficiency virus)  Comment:   Plan: HIV Antigen Antibody Combo          (Z11.3) Routine screening for STI (sexually transmitted infection)  Comment:   Plan: NEISSERIA GONORRHOEA PCR, CHLAMYDIA TRACHOMATIS        PCR          (G47.00) Insomnia, unspecified type  Comment: Trouble with sleep maintenance. Unclear how much of a problem this is to Jj vs something his partner is concerned about. We discussed negative impact of alcohol use on sleep. Suggested trial of controlled release melatonin.   Plan:     (Z78.9) Heavy alcohol use  Comment: Averaging 21 beers a week, spaced out. 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 We discussed increase health risks with >7 and especially >14 drinks a week. Considering cutting back. Will continue to check in on this.   Plan:     (R03.0) Elevated BP without diagnosis of hypertension  Comment: Persistently stage 1-2 BP in office. Advised purchasing home Omron BP cuff, checking for a week and messaging me numbers. Would likely improve if he cuts back  on alcohol intake.   Plan:     Jere Kellogg MD  [facility]  2022, 8:53 AM      SUBJECTIVE:   Robson Samuel is a 31 year old male who presents to clinic today for an annual wellness exam.    # Sleep  - no difficulty falling asleep  - wakes up after typically 3-4 hours, and then every couple of hours thereafter  - wakes up 3-4 times in the night  - no difficulty falling back to sleep, 3-5 minutes  - a handful of times a year will have a sudden jerk - more common during the night, maybe wakes him up or happens after he wakes up, has happened during the day before causing him to drop things  - partner hasn't noticed snoring or apnea  - typically wakes up feeling refreshed     - thinks this issue started in his mid 20s    - goes to bed between 9-10pm  - generally asleep by 10-10:30pm  - 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, on weekends typically will sleep in to 9am, on weekdays when not teaching wakes up at 7am  - still has frequent nocturnal awakenings on weekends    Exercise:  - 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, 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  - 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, less intensive  - once a week will do his own exercise or yoga  - has not noticed association between level of exercise and nocturnal awakenings    Caffeine:  - drinks a full Mong[place]ian press a day (about a L), finishes by noon at the latest      # Health Maintenance  - HIV Screenin18 nonreactive  - STI Screenin18 negative  - Hep C Screenin20 per patient negative  - BP:   BP Readings from Last 3 Encounters:   22 131/82   21 137/74   19 (!) 146/74   - Cholesterol:   Recent Labs   Lab Test 21  0826   CHOL 263.0*   HDL 97.0   .0*   TRIG 85.0     - Diabetes Screenin21 AM glucose 79    Today's PHQ-2 Score:   PHQ-2 (  Pfizer) 2022   Q1: Little interest or pleasure in doing things 0 0   Q2: Feeling  down, depressed or hopeless 0 0   PHQ-2 Score 0 0   PHQ-2 Total Score (12-17 Years)- Positive if 3 or more points; Administer PHQ-A if positive - 0     Review of Systems:   Constitutional - no fevers, chills, night sweats, unintentional weight loss/gain   Eyes - no vision concerns   Ears/Nose/Throat - no hearing concerns, no dysphagia/odynophagia   Cardiovascular - no chest pain, palpitations   Pulmonary - no shortness of breath, wheezing, coughing   GI - no abdominal pain, constipation, diarrhea, nausea, vomiting    - no dysuria, polyuria, hematuria   Musculoskeletal - no joint or muscle pain  Integument - no rash   Neuro - no weakness, numbness, paresthesia   Heme - no easy bruising/bleeding   Endocrine - no polyuria, weight loss/gain, dry skin, excessive sweating, hair loss   Psychiatric - no feelings of depressed mood or anhedonia in past 2 weeks   Allergic/Immunologic - no history of anaphylaxis, no history of recurrent infections    Past Medical History:   Diagnosis Date     Syncope     One episode     Past Surgical History:   Procedure Laterality Date     wisdom teeth       Family History   Problem Relation Age of Onset     Multiple Sclerosis Mother         Doing well     No Known Problems Father      Asthma Sister      Food Allergy Brother      Heart block Maternal Grandfather         pacemaker     Diabetes Cousin         think type 1     Coronary Artery Disease No family hx of      Cerebrovascular Disease No family hx of      Prostate Cancer No family hx of      Colon Cancer No family hx of      Social History     Tobacco Use     Smoking status: Never Smoker     Smokeless tobacco: Never Used   Substance Use Topics     Alcohol use: Yes     Alcohol/week: 21.0 standard drinks     Types: 21 Standard drinks or equivalent per week     Comment: Average of 3 beers a day     Drug use: Yes     Types: Marijuana     Comment: Less than once per month     Social History     Social History Narrative    Has a good  "support system. From [place].  Family is in [place] and supportive. Mom is a nephrologist    Lives alone.    Has a partner    Works as a lyman, also teaches yoga/wellness        Jere Kellogg MD on 1/13/2022 at 8:24 AM                   No current outpatient medications on file.     No current facility-administered medications for this visit.     I have reviewed the patient's past medical, surgical, family, and social history.     OBJECTIVE:   /82 (BP Location: Right arm, Patient Position: Sitting, Cuff Size: Adult Regular)   Pulse 56   Temp 96.8  F (36  C) (Temporal)   Resp 16   Ht 1.803 m (5' 11\")   Wt 77.8 kg (171 lb 8 oz)   SpO2 98%   BMI 23.92 kg/m      Constitutional: well-appearing, appears stated age  Eyes: conjunctivae without erythema, sclera anicteric. Pupils equal, round, and reactive to light.   ENT: oropharynx clear, TM grey bilateral  Cardiac: regular rate and rhythm, normal S1/S2, no murmur/rubs/gallops  Respiratory: lungs clear to auscultation bilaterally, normal work of breathing, no wheezes/crackles  GI: abdomen soft, non-tender, non-distended  Extremities: warm and well perfused, radial pulses 2+ and equal, cap refill brisk.  Lymph: no cervical or supraclavicular lymphadenopathy  Skin: no rashes, lesions, or wounds  Psych: affect is full and appropriate, speech is fluent and non-pressured  "

## 2022-01-13 ENCOUNTER — OFFICE VISIT (OUTPATIENT)
Dept: FAMILY MEDICINE | Facility: CLINIC | Age: 32
End: 2022-01-13
Payer: COMMERCIAL

## 2022-01-13 VITALS
TEMPERATURE: 96.8 F | RESPIRATION RATE: 16 BRPM | SYSTOLIC BLOOD PRESSURE: 131 MMHG | DIASTOLIC BLOOD PRESSURE: 82 MMHG | WEIGHT: 171.5 LBS | OXYGEN SATURATION: 98 % | HEIGHT: 71 IN | BODY MASS INDEX: 24.01 KG/M2 | HEART RATE: 56 BPM

## 2022-01-13 DIAGNOSIS — Z11.4 SCREENING FOR HIV (HUMAN IMMUNODEFICIENCY VIRUS): ICD-10-CM

## 2022-01-13 DIAGNOSIS — E78.00 HIGH CHOLESTEROL: ICD-10-CM

## 2022-01-13 DIAGNOSIS — Z11.3 ROUTINE SCREENING FOR STI (SEXUALLY TRANSMITTED INFECTION): ICD-10-CM

## 2022-01-13 DIAGNOSIS — R03.0 ELEVATED BP WITHOUT DIAGNOSIS OF HYPERTENSION: ICD-10-CM

## 2022-01-13 DIAGNOSIS — R17 ELEVATED BILIRUBIN: ICD-10-CM

## 2022-01-13 DIAGNOSIS — G47.00 INSOMNIA, UNSPECIFIED TYPE: ICD-10-CM

## 2022-01-13 DIAGNOSIS — F10.90 HEAVY ALCOHOL USE: ICD-10-CM

## 2022-01-13 DIAGNOSIS — Z00.00 VISIT FOR WELL MAN HEALTH CHECK: Primary | ICD-10-CM

## 2022-01-13 LAB
ALBUMIN SERPL-MCNC: 4.1 G/DL (ref 3.4–5)
ALP SERPL-CCNC: 56 U/L (ref 40–150)
ALT SERPL W P-5'-P-CCNC: 29 U/L (ref 0–70)
ANION GAP SERPL CALCULATED.3IONS-SCNC: 5 MMOL/L (ref 3–14)
AST SERPL W P-5'-P-CCNC: 20 U/L (ref 0–45)
BILIRUB SERPL-MCNC: 1.2 MG/DL (ref 0.2–1.3)
BUN SERPL-MCNC: 18 MG/DL (ref 7–30)
CALCIUM SERPL-MCNC: 9 MG/DL (ref 8.5–10.1)
CHLORIDE BLD-SCNC: 105 MMOL/L (ref 94–109)
CHOLEST SERPL-MCNC: 217 MG/DL
CO2 SERPL-SCNC: 30 MMOL/L (ref 20–32)
CREAT SERPL-MCNC: 0.9 MG/DL (ref 0.66–1.25)
ERYTHROCYTE [DISTWIDTH] IN BLOOD BY AUTOMATED COUNT: 12.3 % (ref 10–15)
FASTING STATUS PATIENT QL REPORTED: YES
GFR SERPL CREATININE-BSD FRML MDRD: >90 ML/MIN/1.73M2
GLUCOSE BLD-MCNC: 92 MG/DL (ref 70–99)
HCT VFR BLD AUTO: 45.1 % (ref 40–53)
HDLC SERPL-MCNC: 98 MG/DL
HGB BLD-MCNC: 15.3 G/DL (ref 13.3–17.7)
HIV 1+2 AB+HIV1 P24 AG SERPL QL IA: NONREACTIVE
LDLC SERPL CALC-MCNC: 110 MG/DL
MCH RBC QN AUTO: 30.7 PG (ref 26.5–33)
MCHC RBC AUTO-ENTMCNC: 33.9 G/DL (ref 31.5–36.5)
MCV RBC AUTO: 91 FL (ref 78–100)
NONHDLC SERPL-MCNC: 119 MG/DL
PLATELET # BLD AUTO: 247 10E3/UL (ref 150–450)
POTASSIUM BLD-SCNC: 5 MMOL/L (ref 3.4–5.3)
PROT SERPL-MCNC: 7.2 G/DL (ref 6.8–8.8)
RBC # BLD AUTO: 4.98 10E6/UL (ref 4.4–5.9)
SODIUM SERPL-SCNC: 140 MMOL/L (ref 133–144)
TRIGL SERPL-MCNC: 46 MG/DL
WBC # BLD AUTO: 3.5 10E3/UL (ref 4–11)

## 2022-01-13 PROCEDURE — 82040 ASSAY OF SERUM ALBUMIN: CPT | Performed by: FAMILY MEDICINE

## 2022-01-13 PROCEDURE — 87389 HIV-1 AG W/HIV-1&-2 AB AG IA: CPT | Performed by: FAMILY MEDICINE

## 2022-01-13 PROCEDURE — 87591 N.GONORRHOEAE DNA AMP PROB: CPT | Performed by: FAMILY MEDICINE

## 2022-01-13 PROCEDURE — 87491 CHLMYD TRACH DNA AMP PROBE: CPT | Performed by: FAMILY MEDICINE

## 2022-01-13 PROCEDURE — 80053 COMPREHEN METABOLIC PANEL: CPT | Performed by: FAMILY MEDICINE

## 2022-01-13 PROCEDURE — 80061 LIPID PANEL: CPT | Performed by: FAMILY MEDICINE

## 2022-01-13 ASSESSMENT — MIFFLIN-ST. JEOR: SCORE: 1765.05

## 2022-01-13 NOTE — PATIENT INSTRUCTIONS
I recommend you purchase a home blood pressure machine from the list of validated machines at validatebp.org.  The cheapest model on the list is probably the Omron 5 Series, which costs about $50.    I recommend you check your blood pressure once a day, at different times of the day, for 7 days, and then send me those numbers in [employer].    How to check your blood pressure at home:  1) Make sure you use the correct size blood pressure cuff. Usually there will be symptoms on the cuff that will show you if it is too big or too small when you put it on your arm  2) Put the cuff on your bare arm. Don't have clothes between the cuff and your arm  3) Do not talk right before or during the check  4) Have you legs uncrossed and feet flat on the floor  5) Rest in the chair you are going to check your pressure in for at least 5 minutes before checking your pressure  6) Have you arm supported while the machine is checking your pressure  7) Make sure your bladder is empty before checking  8) Check your pressure in both arms and see which one has a higher pressure. From then on, continue to check your pressure in the arm you know has the higher pressure.

## 2022-01-13 NOTE — NURSING NOTE
"31 year old  Chief Complaint   Patient presents with     Physical     31 yrs old        Blood pressure 132/77, pulse 56, temperature 96.8  F (36  C), temperature source Temporal, resp. rate 16, height 1.803 m (5' 11\"), weight 77.8 kg (171 lb 8 oz), SpO2 98 %. Body mass index is 23.92 kg/m .  Patient Active Problem List   Diagnosis     Alcohol dependence (H)       Wt Readings from Last 2 Encounters:   01/13/22 77.8 kg (171 lb 8 oz)   01/06/21 79.5 kg (175 lb 4 oz)     BP Readings from Last 3 Encounters:   01/13/22 132/77   01/06/21 137/74   12/18/19 (!) 146/74         No current outpatient medications on file.     No current facility-administered medications for this visit.       Social History     Tobacco Use     Smoking status: Never Smoker     Smokeless tobacco: Never Used   Substance Use Topics     Alcohol use: Yes     Alcohol/week: 21.0 standard drinks     Types: 21 Standard drinks or equivalent per week     Comment: pt drinks beer every day      Drug use: Yes     Types: Marijuana     Comment: Once per month       Health Maintenance Due   Topic Date Due     ADVANCE CARE PLANNING  Never done     Pneumococcal Vaccine: Pediatrics (0 to 5 Years) and At-Risk Patients (6 to 64 Years) (1 of 2 - PPSV23) Never done     PHQ-2  01/01/2022     PREVENTIVE CARE VISIT  01/06/2022       No results found for: PAP      January 13, 2022 8:07 AM  "

## 2022-01-14 LAB
C TRACH DNA SPEC QL NAA+PROBE: NEGATIVE
N GONORRHOEA DNA SPEC QL NAA+PROBE: NEGATIVE

## 2022-04-28 ENCOUNTER — MYC MEDICAL ADVICE (OUTPATIENT)
Dept: FAMILY MEDICINE | Facility: CLINIC | Age: 32
End: 2022-04-28
Payer: COMMERCIAL

## 2022-04-29 ENCOUNTER — NURSE TRIAGE (OUTPATIENT)
Dept: FAMILY MEDICINE | Facility: CLINIC | Age: 32
End: 2022-04-29
Payer: COMMERCIAL

## 2022-04-29 NOTE — TELEPHONE ENCOUNTER
Reason for Disposition    [1] COVID-19 diagnosed by positive lab test (e.g., PCR, rapid self-test kit) AND [2] mild symptoms (e.g., cough, fever, others) AND [3] no complications or SOB    Answer Assessment - Initial Assessment Questions  1. COVID-19 DIAGNOSIS: Home antigen COVID-19 4/27/22  2. COVID-19 EXPOSURE: Yes, friend he was with on Monday, 4/25/22, tested positive.  3. ONSET: 4/26/22  4. WORST SYMPTOM: Congestion  5. COUGH: Mild cough  6. FEVER: No  7. RESPIRATORY STATUS: Normal  8. BETTER-SAME-WORSE: Same  9. HIGH RISK DISEASE: No  10. VACCINE: Yes, 3/27/22 and 4/13/21  11. BOOSTER: 1st booster on 10/20/21.  Has not had the second booster.  12. PREGNANCY: N/A  13. OTHER SYMPTOMS: Fatigue, headache  14. O2 SATURATION MONITOR:  No    Protocols used: CORONAVIRUS (COVID-19) DIAGNOSED OR [id number] 1.18.2022    Patient doing well with home remedies and is managing well.  Will call clinic with any questions or concerns and will seen urgent care with worsening symptoms over the weekend.  ERIK AvendañoN, RN, [facility]  04/29/22  9:40 AM

## 2023-01-20 ASSESSMENT — ENCOUNTER SYMPTOMS
SHORTNESS OF BREATH: 0
PALPITATIONS: 0
FEVER: 0
MYALGIAS: 0
SORE THROAT: 0
COUGH: 0
ABDOMINAL PAIN: 0
DIARRHEA: 0
JOINT SWELLING: 0
EYE PAIN: 0
WEAKNESS: 0
DYSURIA: 0
CHILLS: 0
FREQUENCY: 0
HEADACHES: 0
PARESTHESIAS: 0
HEARTBURN: 0
HEMATOCHEZIA: 0
HEMATURIA: 0
DIZZINESS: 0
NAUSEA: 0
ARTHRALGIAS: 0
CONSTIPATION: 0
NERVOUS/ANXIOUS: 1

## 2023-01-25 ASSESSMENT — ENCOUNTER SYMPTOMS
FREQUENCY: 0
CHILLS: 0
MYALGIAS: 0
NERVOUS/ANXIOUS: 1
ARTHRALGIAS: 0
HEMATOCHEZIA: 0
NAUSEA: 0
CONSTIPATION: 0
DYSURIA: 0
DIARRHEA: 0
PARESTHESIAS: 0
WEAKNESS: 0
DIZZINESS: 0
HEMATURIA: 0
JOINT SWELLING: 0
HEADACHES: 0
HEARTBURN: 0
FEVER: 0
SHORTNESS OF BREATH: 0
COUGH: 0
EYE PAIN: 0
PALPITATIONS: 0
SORE THROAT: 0
ABDOMINAL PAIN: 0

## 2023-01-26 NOTE — PROGRESS NOTES
SUBJECTIVE:   CC: Jj is an 32 year old who presents for preventative health visit.     Healthy Habits:     Getting at least 3 servings of Calcium per day:  Yes    Bi-annual eye exam:  NO    Dental care twice a year:  Yes    Sleep apnea or symptoms of sleep apnea:  None    Diet:  Regular (no restrictions) and Breakfast skipped    Frequency of exercise:  4-5 days/week    Duration of exercise:  45-60 minutes    Taking medications regularly:  Yes    Medication side effects:  Not applicable    PHQ-2 Total Score: 0    Additional concerns today:  No    Today's PHQ-2 Score:   PHQ-2 ( 1999 [employer]) 1/20/2023   Q1: Little interest or pleasure in doing things 0   Q2: Feeling down, depressed or hopeless 0   PHQ-2 Score 0   PHQ-2 Total Score (12-17 Years)- Positive if 3 or more points; Administer PHQ-A if positive -   Q1: Little interest or pleasure in doing things Not at all   Q2: Feeling down, depressed or hopeless Not at all   PHQ-2 Score 0     Have you ever done Advance Care Planning? (For example, a Health Directive, POLST, or a discussion with a medical provider or your loved ones about your wishes): Yes, patient states has an Advance Care Planning document and will bring a copy to the clinic.     # Stage 1 Hypertension   BP Readings from Last 5 Encounters:   01/27/23 (!) 148/92   04/01/22 138/79   01/13/22 131/82   01/06/21 137/74   12/18/19 (!) 146/74     Creatinine   Date Value Ref Range Status   01/27/2023 0.87 0.67 - 1.17 mg/dL Final   01/13/2022 0.90 0.66 - 1.25 mg/dL Final   01/06/2021 0.93 0.66 - 1.25 mg/dL Final     Sodium   Date Value Ref Range Status   01/27/2023 140 136 - 145 mmol/L Final   01/06/2021 136 133 - 144 mmol/L Final     Potassium   Date Value Ref Range Status   01/27/2023 4.1 3.4 - 5.3 mmol/L Final   01/13/2022 5.0 3.4 - 5.3 mmol/L Final   01/06/2021 5.1 3.4 - 5.3 mmol/L Final     - Not on medication  - at last visit, I suggested reducing alcohol intake and other lifestyle changes   - Physical  activity includes 4 Yoga classes each week, 2 HIIT and 1 flow, and practices on own. Disc golf 5-6 times per week including the winter.   - Normally eating 2 meals/day. Food preping, cook at home, eats a pretty balanced diet. Eating meat for protein, includes red meat, chicken, lamb. Eating 2 servings of vegetables each day.     # High Cholesterol  Recent Labs   Lab Test 01/13/22  0851 01/06/21  0826 12/11/18  0840   CHOL 217* 263.0* 213.0*   HDL 98 97.0 88.0   * 149.0* 96.0   TRIG 46 85.0 145.0   CHOLHDLRATIO  --  2.7 2.4     # Insomnia  - Wakes up in the middle of the night 5 times/night. Happening for multiple years.   - Takes 5 minutes to fall back asleep, does not feel like his mind is racing when he wakes up.   - Endorses feeling refreshed when he wakes up in the morning most nights.   - Normally gets about 7-8 hours. Goes to bed at 10:00, wakes up between 5:00-7:00.   - Hasn't consistently tried any medication for sleep.   - Drinks a beer consistently about 1 hour before sleep. Discussed decreasing alcohol intake before bed, increasing physical activity, and melatonin to decrease secondary insomnia.     # Anxiety  - Anxious person about multiple things. Significant life stress and thought over-processing.   - Recently changed careers  - Getting series with his partner (talking about marriage)  - Desires to see a therapist, discussed available options    #Full Body Jerk  - Present for 3 years, happens 4-5 times per week.   - Happens when he is awake and sleeping.   - Is not worsening and does not seem to be affecting his activities of daily living.     # Leukopenia  Lab Results   Component Value Date    WBC 3.5 01/13/2022     Lab Results   Component Value Date    HGB 15.3 01/13/2022     Lab Results   Component Value Date     01/13/2022     Social History     Tobacco Use     Smoking status: Never     Smokeless tobacco: Never   Substance Use Topics     Alcohol use: Yes     Alcohol/week: 24.0  standard drinks     Types: 3 Cans of beer, 21 Standard drinks or equivalent per week     Comment: 3 drinks per day.       Alcohol Use 1/20/2023   Prescreen: >3 drinks/day or >7 drinks/week? Yes   Prescreen: >3 drinks/day or >7 drinks/week? -   AUDIT SCORE  13     AUDIT - Alcohol Use Disorders Identification Test - Reproduced from the World Health Organization Audit 2001 (Second Edition) 1/20/2023   1.  How often do you have a drink containing alcohol? 4 or more times a week   2.  How many drinks containing alcohol do you have on a typical day when you are drinking? 3 or 4   3.  How often do you have five or more drinks on one occasion? Weekly   4.  How often during the last year have you found that you were not able to stop drinking once you had started? Never   5.  How often during the last year have you failed to do what was normally expected of you because of drinking? Never   6.  How often during the last year have you needed a first drink in the morning to get yourself going after a heavy drinking session? Never   7.  How often during the last year have you had a feeling of guilt or remorse after drinking? Less than monthly   8.  How often during the last year have you been unable to remember what happened the night before because of your drinking? Never   9.  Have you or someone else been injured because of your drinking? No   10. Has a relative, friend, doctor or other health care worker been concerned about your drinking or suggested you cut down? Yes, during the last year   TOTAL SCORE 13       Reviewed and updated as needed this visit by clinical staff   Tobacco  Allergies  Meds   Med Hx  Surg Hx  Fam Hx  Soc Hx      Reviewed and updated as needed this visit by Provider   Tobacco  Allergies    Med Hx  Surg Hx  Fam Hx           Review of Systems   Constitutional: Negative for chills and fever.   HENT: Negative for congestion, ear pain, hearing loss and sore throat.    Eyes: Negative for pain and  visual disturbance.   Respiratory: Negative for cough and shortness of breath.    Cardiovascular: Negative for chest pain, palpitations and peripheral edema.   Gastrointestinal: Negative for abdominal pain, constipation, diarrhea, heartburn, hematochezia and nausea.   Genitourinary: Negative for dysuria, frequency, genital sores, hematuria, impotence, penile discharge and urgency.   Musculoskeletal: Negative for arthralgias, joint swelling and myalgias.   Skin: Negative for rash.   Neurological: Negative for dizziness, weakness, headaches and paresthesias.   Psychiatric/Behavioral: Negative for mood changes. The patient is nervous/anxious.      OBJECTIVE:   BP (!) 148/92 (BP Location: Left arm, Cuff Size: Adult Regular)   Pulse 54   Temp 97.4  F (36.3  C) (Skin)   Resp 16   Ht 1.829 m (6')   Wt 78.9 kg (174 lb)   SpO2 98%   BMI 23.60 kg/m      Physical Exam  GENERAL: Healthy, alert and no distress  EYES: Eyes grossly normal to inspection, PERRL and conjunctivae and sclerae normal  RESP: Lungs clear to auscultation - no rales, rhonchi or wheezes  CV: regular rate and rhythm, normal S1 S2, no S3 or S4, no murmur, click or rub, no peripheral edema and peripheral pulses strong  MS: No gross musculoskeletal defects noted, no edema  SKIN: No suspicious lesions or rashes  NEURO: Normal strength and tone, mentation intact and speech normal  PSYCH: Mentation appears normal, affect normal/bright    Labs reviewed in Epic    ASSESSMENT/PLAN:   (T75.3XXA) Motion sickness, initial encounter  (primary encounter diagnosis)  Comment: Patient going for afternoon boat ride and scuba diving. Has a history of sea-sickness and desired a patch to reduce symptoms of nausea and vomiting.   Plan:    - scopolamine (TRANSDERM) 1 MG/3DAYS 72 hr patch         (F51.01) Primary insomnia  Comment: No trouble initially falling asleep, but waking up 5-6 times/night. Discussed decreasing alcohol intake before bed and increasing exercise  throughout the day. Discussed OTC medications for sleep including melatonin.  Plan:    - Adult Sleep Eval & Management Referral   - OTC melatonin          (F10.90) Heavy alcohol use  Comment: Patient drinks >3 drinks/day. Discussed increased risk of heart disease, cancer, hypertension, and liver failure with the patient. His partner is concerned about liver failure in the future but he is not currently interested in decreasing his alcohol intake.   Plan:    - Comprehensive metabolic panel         (E78.00) High cholesterol  Comment: Hx of hypercholesterolemia. Cholesterol was elevated to 217 and LDL was 110 on 1/13/22. Recheck to determine if cholesterol levels have remained stable.   Plan:    - Lipid panel reflex to direct LDL Non-fasting          (D72.819) Leukopenia, unspecified type  Comment: Patient was leukopenic on 1/13/22 to 3.5. Plan to recheck to confirm adequate increase in WBC count.   Plan:   - CBC with platelets differential           (Z23) Need for Streptococcus pneumoniae vaccination  Comment: Although not normally recommended in this patient population, patient qualifies for vaccine due to alcohol use.   Plan:   -  PNEUMOCOCCAL 20 VALENT CONJUGATE (PREVNAR 20)    COUNSELING:   Reviewed preventive health counseling, as reflected in patient instructions    He reports that he has never smoked. He has never used smokeless tobacco.      Nadir Rod, MS3  [facility] Medical Student    I was present with the medical student who participated in the service and in the documentation of the note. I have verified the history and personally performed the physical exam and medical decision making. I agree with the assessment and plan of care as documented in the note with the following additions:   (Z00.00) Annual physical exam  (primary encounter diagnosis)  Comment: Age appropriate screening and preventive services provided.     (G25.5) Muscle, jerky movements (uncontrolled)  Comment: Sound like  hypnic jerks but Jj thinks they could sometimes happen when he is not sleepy. Going on for years and not clearly progressive or bothersome to him. Discussed that we could refer him to a movement specialist with neurology at any point if he or his partner are worried about them, but that with the story so far I am not overly concerned by them.   Plan:     Jere Kellogg MD  6:38 PM, January 28, 2023

## 2023-01-27 ENCOUNTER — OFFICE VISIT (OUTPATIENT)
Dept: FAMILY MEDICINE | Facility: CLINIC | Age: 33
End: 2023-01-27
Payer: COMMERCIAL

## 2023-01-27 VITALS
OXYGEN SATURATION: 98 % | RESPIRATION RATE: 16 BRPM | HEIGHT: 72 IN | WEIGHT: 174 LBS | TEMPERATURE: 97.4 F | BODY MASS INDEX: 23.57 KG/M2 | HEART RATE: 54 BPM | DIASTOLIC BLOOD PRESSURE: 92 MMHG | SYSTOLIC BLOOD PRESSURE: 148 MMHG

## 2023-01-27 DIAGNOSIS — T75.3XXA MOTION SICKNESS, INITIAL ENCOUNTER: ICD-10-CM

## 2023-01-27 DIAGNOSIS — Z00.00 ANNUAL PHYSICAL EXAM: Primary | ICD-10-CM

## 2023-01-27 DIAGNOSIS — G25.5 MUSCLE, JERKY MOVEMENTS (UNCONTROLLED): ICD-10-CM

## 2023-01-27 DIAGNOSIS — Z23 NEED FOR STREPTOCOCCUS PNEUMONIAE VACCINATION: ICD-10-CM

## 2023-01-27 DIAGNOSIS — D72.819 LEUKOPENIA, UNSPECIFIED TYPE: ICD-10-CM

## 2023-01-27 DIAGNOSIS — G47.00 INSOMNIA, UNSPECIFIED TYPE: ICD-10-CM

## 2023-01-27 DIAGNOSIS — E78.00 HIGH CHOLESTEROL: ICD-10-CM

## 2023-01-27 DIAGNOSIS — F10.90 HEAVY ALCOHOL USE: ICD-10-CM

## 2023-01-27 LAB
ALBUMIN SERPL BCG-MCNC: 5 G/DL (ref 3.5–5.2)
ALP SERPL-CCNC: 53 U/L (ref 40–129)
ALT SERPL W P-5'-P-CCNC: 21 U/L (ref 10–50)
ANION GAP SERPL CALCULATED.3IONS-SCNC: 11 MMOL/L (ref 7–15)
AST SERPL W P-5'-P-CCNC: 25 U/L (ref 10–50)
BASO+EOS+MONOS # BLD AUTO: 0.4 10E3/UL (ref 0–2.2)
BASO+EOS+MONOS NFR BLD AUTO: 9 %
BILIRUB SERPL-MCNC: 1.9 MG/DL
BUN SERPL-MCNC: 11.3 MG/DL (ref 6–20)
CALCIUM SERPL-MCNC: 9.3 MG/DL (ref 8.6–10)
CHLORIDE SERPL-SCNC: 102 MMOL/L (ref 98–107)
CHOLEST SERPL-MCNC: 216 MG/DL
CREAT SERPL-MCNC: 0.87 MG/DL (ref 0.67–1.17)
DEPRECATED HCO3 PLAS-SCNC: 27 MMOL/L (ref 22–29)
ERYTHROCYTE [DISTWIDTH] IN BLOOD BY AUTOMATED COUNT: 12.2 % (ref 10–15)
GFR SERPL CREATININE-BSD FRML MDRD: >90 ML/MIN/1.73M2
GLUCOSE SERPL-MCNC: 90 MG/DL (ref 70–99)
HCT VFR BLD AUTO: 46.7 % (ref 40–53)
HDLC SERPL-MCNC: 90 MG/DL
HGB BLD-MCNC: 15.7 G/DL (ref 13.3–17.7)
LDLC SERPL CALC-MCNC: 115 MG/DL
LYMPHOCYTES # BLD AUTO: 1.6 10E3/UL (ref 0.8–5.3)
LYMPHOCYTES NFR BLD AUTO: 34 %
MCH RBC QN AUTO: 30.4 PG (ref 26.5–33)
MCHC RBC AUTO-ENTMCNC: 33.6 G/DL (ref 31.5–36.5)
MCV RBC AUTO: 90 FL (ref 78–100)
NEUTROPHILS # BLD AUTO: 2.7 10E3/UL (ref 1.6–8.3)
NEUTROPHILS NFR BLD AUTO: 57 %
NONHDLC SERPL-MCNC: 126 MG/DL
PLATELET # BLD AUTO: 245 10E3/UL (ref 150–450)
POTASSIUM SERPL-SCNC: 4.1 MMOL/L (ref 3.4–5.3)
PROT SERPL-MCNC: 6.9 G/DL (ref 6.4–8.3)
RBC # BLD AUTO: 5.17 10E6/UL (ref 4.4–5.9)
SODIUM SERPL-SCNC: 140 MMOL/L (ref 136–145)
TRIGL SERPL-MCNC: 57 MG/DL
WBC # BLD AUTO: 4.7 10E3/UL (ref 4–11)

## 2023-01-27 PROCEDURE — 80061 LIPID PANEL: CPT | Performed by: FAMILY MEDICINE

## 2023-01-27 PROCEDURE — 80053 COMPREHEN METABOLIC PANEL: CPT | Performed by: FAMILY MEDICINE

## 2023-01-27 RX ORDER — SCOLOPAMINE TRANSDERMAL SYSTEM 1 MG/1
1 PATCH, EXTENDED RELEASE TRANSDERMAL
Qty: 1 PATCH | Refills: 3 | Status: SHIPPED | OUTPATIENT
Start: 2023-01-27 | End: 2024-06-25

## 2023-01-27 RX ORDER — MULTIVIT-MIN/IRON/FOLIC ACID/K 18-600-40
CAPSULE ORAL
COMMUNITY
End: 2024-06-25

## 2023-01-27 NOTE — NURSING NOTE
32 year old  Chief Complaint   Patient presents with     Physical     Routine, small list of concerns from partner. Wants to address if there is time.        Blood pressure (!) 144/84, pulse 54, temperature 97.4  F (36.3  C), temperature source Skin, resp. rate 16, height 1.829 m (6'), weight 78.9 kg (174 lb), SpO2 98 %. Body mass index is 23.6 kg/m .  Patient Active Problem List   Diagnosis     Heavy alcohol use     Insomnia, unspecified type     Elevated bilirubin     Stage 1 hypertension       Wt Readings from Last 2 Encounters:   01/27/23 78.9 kg (174 lb)   01/13/22 77.8 kg (171 lb 8 oz)     BP Readings from Last 3 Encounters:   01/27/23 (!) 144/84   04/01/22 138/79   01/13/22 131/82         Current Outpatient Medications   Medication     ASHWAGANDHA PO     Cholecalciferol (VITAMIN D) 50 MCG (2000 UT) CAPS     Multiple Vitamin (MULTIVITAMIN ADULT PO)     No current facility-administered medications for this visit.       Social History     Tobacco Use     Smoking status: Never     Smokeless tobacco: Never   Vaping Use     Vaping Use: Never used   Substance Use Topics     Alcohol use: Yes     Alcohol/week: 21.0 standard drinks     Types: 21 Standard drinks or equivalent per week     Comment: 3 drinks per day.     Drug use: Yes     Types: Marijuana     Comment: Occasionally       Health Maintenance Due   Topic Date Due     ADVANCE CARE PLANNING  Never done     Pneumococcal Vaccine: Pediatrics (0 to 5 Years) and At-Risk Patients (6 to 64 Years) (1 - PCV) Never done     YEARLY PREVENTIVE VISIT  01/13/2023       No results found for: PAP      January 27, 2023 2:33 PM

## 2023-01-27 NOTE — NURSING NOTE
Prior to immunization administration, verified patients identity using patient s name and date of birth. Please see Immunization Activity for additional information.     Screening Questionnaire for Adult Immunization    Are you sick today?   No   Do you have allergies to medications, food, a vaccine component or latex?   No   Have you ever had a serious reaction after receiving a vaccination?   No   Do you have a long-term health problem with heart, lung, kidney, or metabolic disease (e.g., diabetes), asthma, a blood disorder, no spleen, complement component deficiency, a cochlear implant, or a spinal fluid leak?  Are you on long-term aspirin therapy?   No   Do you have cancer, leukemia, HIV/AIDS, or any other immune system problem?   No   Do you have a parent, brother, or sister with an immune system problem?   No   In the past 3 months, have you taken medications that affect  your immune system, such as prednisone, other steroids, or anticancer drugs; drugs for the treatment of rheumatoid arthritis, Crohn s disease, or psoriasis; or have you had radiation treatments?   No   Have you had a seizure, or a brain or other nervous system problem?   No   During the past year, have you received a transfusion of blood or blood    products, or been given immune (gamma) globulin or antiviral drug?   No   For women: Are you pregnant or is there a chance you could become       pregnant during the next month?   No   Have you received any vaccinations in the past 4 weeks?   No     Immunization questionnaire answers were all negative.        Per orders of Dr. Kellogg, injection of PCV20 given by Freda Lee LPN. Patient instructed to remain in clinic for 15 minutes afterwards, and to report any adverse reaction to me immediately.       Screening performed by Freda Lee LPN on 1/27/2023 at 4:04 PM.

## 2023-01-27 NOTE — PATIENT INSTRUCTIONS
1) Schedule an appointment with an optometrist if you desire an eye exam.     2) Sleep specialist will call you within the next few days, or you can call and schedule an appointment at  [phone]    3) Call your insurance to make sure a therapy session with Shawn Ullrich is covered. Then you can call us to schedule    4) Keep track of the number of body jerks you are experiencing and if it is affecting your activities of daily living. Message me on CX to let me know the frequency and level of life disruption.     I recommend you purchase a home blood pressure machine from the list of validated machines at validatebp.org.  The cheapest model on the list is probably the Omron 5 Series, which costs about $50.    I recommend you check your blood pressure every day for 7 days. Try to check 2-3 times in a row, a minute apart, and write down the numbers. Ideally, each day try to check at a different time of the day, since your blood pressure will change over the course of the day.     Please send me those numbers in CX after a week.     How to check your blood pressure at home:  - Make sure you use the correct size blood pressure cuff. Usually there will be symptoms on the cuff that will show you if it is too big or too small when you put it on your arm  - Put the cuff on your bare arm. Don't have clothes between the cuff and your arm  - Do not talk right before or during the check  - Have you legs uncrossed and feet flat on the floor  - Rest in the chair you are going to check your pressure in for at least 5 minutes before checking your pressure  - Have you arm supported while the machine is checking your pressure  - Make sure your bladder is empty before checking  - Don't exercise or have caffeine within 30 minutes of checking your pressure  - Check your pressure in both arms and see which one has a higher pressure. From then on, continue to check your pressure in the arm you know has the higher  pressure.

## 2023-01-28 PROBLEM — G47.00 INSOMNIA, UNSPECIFIED TYPE: Chronic | Status: ACTIVE | Noted: 2022-01-13

## 2023-01-28 PROBLEM — F10.90 HEAVY ALCOHOL USE: Chronic | Status: ACTIVE | Noted: 2021-01-06

## 2023-01-28 PROBLEM — I10 ESSENTIAL HYPERTENSION: Chronic | Status: ACTIVE | Noted: 2022-04-01

## 2023-01-28 PROBLEM — I10 ESSENTIAL HYPERTENSION: Status: ACTIVE | Noted: 2022-04-01

## 2023-03-14 ASSESSMENT — ANXIETY QUESTIONNAIRES
4. TROUBLE RELAXING: NOT AT ALL
7. FEELING AFRAID AS IF SOMETHING AWFUL MIGHT HAPPEN: NOT AT ALL
1. FEELING NERVOUS, ANXIOUS, OR ON EDGE: NOT AT ALL
6. BECOMING EASILY ANNOYED OR IRRITABLE: NOT AT ALL
GAD7 TOTAL SCORE: 0
3. WORRYING TOO MUCH ABOUT DIFFERENT THINGS: NOT AT ALL
GAD7 TOTAL SCORE: 0
2. NOT BEING ABLE TO STOP OR CONTROL WORRYING: NOT AT ALL
5. BEING SO RESTLESS THAT IT IS HARD TO SIT STILL: NOT AT ALL
GAD7 TOTAL SCORE: 0
7. FEELING AFRAID AS IF SOMETHING AWFUL MIGHT HAPPEN: NOT AT ALL

## 2023-03-14 ASSESSMENT — PATIENT HEALTH QUESTIONNAIRE - PHQ9
SUM OF ALL RESPONSES TO PHQ QUESTIONS 1-9: 2
SUM OF ALL RESPONSES TO PHQ QUESTIONS 1-9: 2
10. IF YOU CHECKED OFF ANY PROBLEMS, HOW DIFFICULT HAVE THESE PROBLEMS MADE IT FOR YOU TO DO YOUR WORK, TAKE CARE OF THINGS AT HOME, OR GET ALONG WITH OTHER PEOPLE: NOT DIFFICULT AT ALL

## 2023-03-14 NOTE — PROGRESS NOTES
"    [facility]      PATIENT'S NAME: Robson Samuel  PREFERRED NAME: Jj  PRONOUNS:  he/him     MRN: [id number]  : 1990  ADDRESS: [address]:  [id number]  DATE OF SERVICE: 3/15/23  START TIME: 2:07 pm  END TIME: 3:02 pm  PREFERRED PHONE: [phone]  May we leave a program related message: Yes  SERVICE MODALITY:  In-person    UNIVERSAL ADULT Mental Health DIAGNOSTIC ASSESSMENT    Identifying Information:  Patient is a 33 year old,  individual.  Patient was referred for an assessment by other.  Patient attended the session alone.    Chief Complaint:   The reason for seeking services at this time is: \"Exploring known benefits of therapy.  I'd guess like many folks I'm underreporting on anxiety.  I like to think I take decent care of myself and manage my energy in relationships well, and, there's always room to be a better self/partner/friend/family/etc\".  The problem(s) began throughout adulthood.  Patient has not attempted to resolve these concerns in the past.    Jj presented as anxious, with congruent affect, loquacious, circumstantial, at times B[facility] had difficulty interjecting or redirecting, psychomotor agitation, pleasant and cooperative, fair concentration, and AOx4.       Initially, Jj had difficulty identifying any mental health symptoms he's experienced/experiencing or things he would like to change in his life, but as [facility] and Jj spent more time together his anxiety decreased, and as he grew calmer he was able to share more specifically about his mental health and challenges. Jj reported his partner (who is a mental health therapist) and his mother (who is a MD) have both mentioned that he should talk to someone.  Jj reported his partner believes he gets \"stressed out\" and he noted \"I get hyped up\" and can be a \"ball of stress\".  Jj reported he's always been a fast talker, has a lot of energy, and is an " "extrovert.  Jj reported when he gets stressed about something he will experience worry thoughts (\"I'm a terrible person\"), racing thoughts, ruminate, his heart rate increases, and his friends/family have told him to \"calm down\".  Jj reported 1-2x's/ year he will get so overwhelmed that he \"shuts down\", during which he is unable to engage in any activities or with other people (typically lasts a day).  Jj reported history of sleep issues, stating he will fall asleep, after 3 hours of sleep he will wake, and then he will fall asleep shortly, but then for the rest of the night he wakes every 1-1.5 hrs, falls back asleep, and wakes again.  Jj acknowledged past periods of low mood, although is baseline mood is primarily euthymic.  When inquiring about current life event, Jj denied challenges or stressors, but he reported recently leaving a job at which there was a \"toxic\" environment, he started a new job, he and his partner have recently purchased and started to renovate the home, and he plans on getting  in the near future.  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     Jj denied past current SI/SIB/SA or psychiatric hospitalizations.  Jj denied any past engagement in mental health services, and denied past medication trials.      When discussing alcohol use, Jj stated, \"I drink most days and have multiple drinks\".  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  Jj affirmed this is a significant number of drinks per week, but when asked about desired changes regarding alcohol use he stated, \"I drink and I'm not worried about it\".  Jj reported he uses cannabis approximately 1x/week, which has been a steady pattern for years, although this is a significant reduction from college when he used cannabis for consecutive " days for long periods of time; jj expressed no desire to change cannabis use.  Jj reported he currently consumes approximately 1 L of coffee per day (1 Irish Press), which is half of what he was consuming (previously was 2 Irish Presses per day); Jj expressed no desire to change caffeine use.     Social/Family History:  Patient reported they grew up in other [place], [place].  They were raised by biological parents  .  Parents were always together.   Patient described their current relationships with family of origin as: positive, regular contact with family members; 2 siblings.    The patient describes their cultural background as .  Cultural influences and impact on patient's life structure, values, norms, and healthcare: None really.  For additional context;Born: [place];Age 4: moved to [place] (my mom was in  payback years, they helped her get through [employer] med school);Age 8: moved to [place];Age 20: moved to [facility] ([facility]).  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  Lots of yoga on the side, including lots of teaching (nearly full-time).;Age 31: met my current partner (didn't date much or seriously in my 20s).  Teach and practice yoga less, but still important and near daily.;Age 32 (6 months ago): took current job ( at another software company, much smaller, on a referral from my best friend).  Contextual influences on patient's health include: Contextual Factors: Individual Factors ; mother is MD and partner is mh therapist.    These factors will be addressed in the Preliminary  Treatment plan. Patient identified their preferred language to be English. Patient reported they does not need the assistance of an  or other support involved in therapy.     Patient reported had no significant delays in developmental tasks.   Patient's highest education level was college graduate  .  Patient identified the following learning problems: none reported.  Modifications will not be used to assist communication in therapy. Patient reports they are  able to understand written materials.    Patient reported the following relationship history: no marriages/divorces.  Patient's current relationship status is has a partner or significant other for 2 years.  Patient identified their sexual orientation as heterosexual.  Patient reported having  no child(kaitlynn). Patient identified partner; parents; siblings; friends; co-worker as part of their support system.  Patient identified the quality of these relationships as stable and meaningful,  .      Patient's current living/housing situation involves other.  The immediate members of family and household include Velma Barry Lundy,Partner  and they report that housing is stable.    Patient is currently employed fulltime.  Patient reports their finances are obtained through employment; other. Patient does identify finances as a current stressor.      Patient reported that they have not been involved with the legal system.   Patient does not report being under probation/ parole/ jurisdiction. They are not under any current court jurisdiction. .    Patient's Strengths and Limitations:  Patient identified the following strengths or resources that will help them succeed in treatment: commitment to health and well being, friends / good social support, family support, intelligence, motivation, strong social skills and work ethic. Things that may interfere with the patient's success in treatment include: none identified.     Assessments:  The following assessments  were completed by patient for this visit:  PHQ9:   PHQ-9 SCORE 3/14/2023   PHQ-9 Total Score MyChart 2 (Minimal depression)   PHQ-9 Total Score 2     GAD7:   LEN-7 SCORE 3/14/2023   Total Score 0 (minimal anxiety)   Total Score 0     CAGE-AID:   CAGE-AID Total Score 3/14/2023   Total Score 1   Total Score MyChart 1 (A total score of 2 or greater is considered clinically significant)     PROMIS 10-Global Health (only subscores and total score):   PROMIS-10 Scores Only 3/14/2023   Global Mental Health Score 16   Global Physical Health Score 17   PROMIS TOTAL - SUBSCORES 33       Personal and Family Medical History:  Patient does not report a family history of mental health concerns.  Patient reports family history includes Asthma in his sister; Cystic Fibrosis in his cousin; Diabetes in his cousin; Food Allergy in his brother; Heart block in his maternal grandfather; Multiple Sclerosis in his mother; No Known Problems in his father; Other - See Comments in his maternal grandfather..      Patient does not report Mental Health Diagnosis or Treatment.     SI: never  SIB: never  SA: none  Psychiatric Hospitalization: none  No past psychotropic medication trials      Patient has had a physical exam to rule out medical causes for current symptoms.  Date of last physical exam was within the past year. Client was encouraged to follow up with PCP if symptoms were to develop. The patient has a [place] [facility] Provider, who is named Jere Kellogg.  Patient reports no current medical concerns.  Patient denies any issues with pain..   There are not significant appetite / nutritional concerns / weight changes.   Patient does not report a history of head injury / trauma / cognitive impairment.      Patient reports current meds as:   No outpatient medications have been marked as taking for the 3/15/23 encounter (Appointment) with Ullrich, Shawn G, LICSW.       Medication Adherence:  Patient reports not currently  prescribed.      Patient Allergies:  No Known Allergies    Medical History:    Past Medical History:   Diagnosis Date     Elevated bilirubin 01/13/2022     Insomnia, unspecified type 01/13/2022     Stage 1 hypertension 04/01/2022    - Home BP average 138/79 over 11 days Jan-Feb 2022     Syncope 2018    One episode         Current Mental Status Exam:   Appearance:  Appropriate    Eye Contact:  Good   Psychomotor:  Restless       Gait / station:  no problem  Attitude / Demeanor: Cooperative   Speech      Rate / Production: Talkative      Volume:  Normal  volume      Language:  intact  Mood:   Anxious   Affect:   Worrisome    Thought Content: Clear   Thought Process: Coherent       Associations: No loosening of associations  Insight:   Fair   Judgment:  Intact   Orientation:  All  Attention/concentration: Fair      Substance Use:  Patient did not report a family history of substance use concerns; see medical history section for details.  Patient has not received chemical dependency treatment in the past.  Patient has not ever been to detox.      Patient is not currently receiving any chemical dependency treatment.           Substance History of use Age of first use Date of last use     Pattern and duration of use (include amounts and frequency)   Alcohol currently use   19 03/13/23 Daily; see above   Cannabis   currently use 19 03/10/23 Weekl; see above   Amphetamines   used in the past   01/01/17 REPORTS SUBSTANCE USE: N/A   Cocaine/crack    never used       REPORTS SUBSTANCE USE: N/A   Hallucinogens never used         REPORTS SUBSTANCE USE: N/A   Inhalants never used         REPORTS SUBSTANCE USE: N/A   Heroin never used         REPORTS SUBSTANCE USE: N/A   Other Opiates never used     REPORTS SUBSTANCE USE: N/A   Benzodiazepine   never used     REPORTS SUBSTANCE USE: N/A   Barbiturates never used     REPORTS SUBSTANCE USE: N/A   Over the counter meds used in the past 16 01/01/23 REPORTS SUBSTANCE USE: N/A   Caffeine  used in the past 10   Daily; see above   Nicotine  used in the past 27 01/01/19 REPORTS SUBSTANCE USE: N/A   Other substances not listed above:  Identify:  never used     REPORTS SUBSTANCE USE: N/A     Patient reported the following problems as a result of their substance use: no problems, not applicable.    Substance Use: daily use and cravings/urges to use    Based on the negative CAGE score and clinical interview there  are indications of drug or alcohol abuse. Diagnostic assessment for substance use disorder completed. Therapist did recommend client to reduce use or abstain from alcohol or substance use. Therapist did not recommend structured treatment and or community support (AA, 12 step group, etc.). due to patient is in precontemplation/contemplation stage of change, instead [facility] educated patient about impact of alcohol use on health and mental health,       Significant Losses / Trauma / Abuse / Neglect Issues:   Patient did not serve in the .  There are indications or report of significant loss, trauma, abuse or neglect issues related to: are no indications and client denies any losses, trauma, abuse, or neglect concerns.  Concerns for possible neglect are not present.     Safety Assessment:   Patient denies current homicidal ideation and behaviors.  Patient denies current self-injurious ideation and behaviors.    Patient denied risk behaviors associated with substance use.  Patient denies any high risk behaviors associated with mental health symptoms.   Patient reports the following current concerns for their personal safety: None.  Patient reports there are not firearms in the house.       History of Safety Concerns:  Patient denied a history of homicidal ideation.     Patient denied a history of personal safety concerns.    Patient denied a history of assaultive behaviors.    Patient denied a history of sexual assault behaviors.     Patient denied a history of risk behaviors associated with  substance use.  Patient denies any history of high risk behaviors associated with mental health symptoms.  Patient reports the following protective factors: forward or future oriented thinking; dedication to family or friends; safe and stable environment; effectively controls impulses; regular physical activity; sense of belonging; purpose; secure attachment; help seeking behaviors when distressed; living with other people; daily obligations; structured day; commitment to well being; sense of meaning; positive social skills; healthy fear of risky behaviors or pain; financial stability; strong sense of self worth or esteem; sense of personal control or determination    Risk Plan:  See Recommendations for Safety and Risk Management Plan    Review of Symptoms per patient report:   Depression: Change in sleep, Excessive or inappropriate guilt, Psychomotor slowing or agitation, Ruminations and Feeling sad, down, or depressed  Nikki:  No Symptoms  Psychosis: No Symptoms  Anxiety: Excessive worry, Nervousness, Physical complaints, such as headaches, stomachaches, muscle tension, Sleep disturbance, Psychomotor agitation, Ruminations and Poor concentration  Panic:  Palpitations  Post Traumatic Stress Disorder:  No Symptoms   Eating Disorder: No Symptoms  ADD / ADHD:  No symptoms  Conduct Disorder: No symptoms  Autism Spectrum Disorder: No symptoms  Obsessive Compulsive Disorder: No Symptoms    Patient reports the following compulsive behaviors and treatment history: None.      Diagnostic Criteria:   Generalized Anxiety Disorder  A. Excessive anxiety and worry about a number of events or activities (such as work or school performance).   B. The person finds it difficult to control the worry.  C. Select 3 or more symptoms (required for diagnosis). Only one item is required in children.   - Restlessness or feeling keyed up or on edge.    - Difficulty concentrating or mind going blank.    - Muscle tension.    - Sleep disturbance  (difficulty falling or staying asleep, or restless unsatisfying sleep).   D. The focus of the anxiety and worry is not confined to features of an Axis I disorder.  E. The anxiety, worry, or physical symptoms cause clinically significant distress or impairment in social, occupational, or other important areas of functioning.   F. The disturbance is not due to the direct physiological effects of a substance (e.g., a drug of abuse, a medication) or a general medical condition (e.g., hyperthyroidism) and does not occur exclusively during a Mood Disorder, a Psychotic Disorder, or a Pervasive Developmental Disorder.    - The aformentioned symptoms began 10 year(s) ago and occurs 7 days per week and is experienced as moderate.      Functional Status:  Patient reports the following functional impairments:  health maintenance, relationship(s) and self-care.     Nonprogrammatic care:  Patient is requesting basic services to address current mental health concerns.    Clinical Summary:  1. Reason for assessment: referred by self and PCP due to anxiety, alcohol, and stress management.  2. Psychosocial, Cultural and Contextual Factors:  , heterosexual, partnered, male; employed  3. Principal DSM5 Diagnoses  (Sustained by DSM5 Criteria Listed Above):   300.02 (F41.1) Generalized Anxiety Disorder.  4. Other Diagnoses that is relevant to services:   Substance-Related & Addictive Disorders Alcohol Use Disorder   303.90 (F10.20) Moderate ..  5. Provisional Diagnosis:  NA  6. Prognosis: Expect Improvement, Relieve Acute Symptoms and Maintain Current Status / Prevent Deterioration.  7. Likely consequences of symptoms if not treated: Continued/worsening symptoms and increased/prolonged functional impairment  8. Client strengths include:  caring, educated, empathetic, employed, goal-focused, intelligent, support of family, friends and providers and supportive .     Recommendations:     1. Plan for Safety and Risk  Management:   Safety and Risk: Recommended that patient call 911 or go to the local ED should there be a change in any of these risk factors..          Report to child / adult protection services was NA.     2. Patient's identified wanting to use whole person wellness approach to health, recognizing the interaction between mind and body.     3. Initial Treatment will focus on:    Anxiety - symptoms, warning signs, coping skills  Alcohol / Substance Use - symptoms, triggers, coping skills.     4. Resources/Service Plan:    services are not indicated.   Modifications to assist communication are not indicated.   Additional disability accommodations are not indicated.      5. Collaboration:   Collaboration / coordination of treatment will be initiated with the following  support professionals: primary care physician.      6.  Referrals:   The following referral(s) will be initiated: Outpatient Mental [facility] Therapy and Psychotropic Medication Evaluation. Next Scheduled Appointment: no appt scheduled.     [facility] recommended pt significantly reduce or abstain from alcohol use, abstain from cannabis use, and decrease caffeine consumption.    [facility] recommended psychotropic medication evaluation and can begin with PCP.      [facility] recommended outpatient mental health therapy services.     Pt expressed ambivalence and was in contemplation stage about reducing alcohol, cannabis and caffeine consumption, as well as medication evaluation.  Pt was receptive mental health therapy with [facility], but did not schedule [facility] appt at this time, but instead he will contact health insurance plan for more information about cost of services, and also discuss recommendations with partner and family, prior to scheduling future [facility] appt.  .      A Release of Information has been obtained for the following: NA.     Emergency Contact was not obtained.      Clinical Substantiation/medical necessity for the above recommendations:  Jj presented with  anxious mood and congruent affect, and endorsed symptoms of anxiety throughout adulthood.  Jj also endorsed current/past symptoms of alcohol use disorder, and consistent use of cannabis.  Jj reported several recent significant changes/stressors including leaving job, starting new job, purchasing home with partner, and future marriage.  Jj denied past mental health services and denied past/current safety issues.  [facility] recommended significantly reducing or abstaining from alcohol use, abstaining from cannabis use, and decreasing caffeine consumption;  psychotropic medication evaluation; and outpatient mental health therapy services.       7. LEI:    LEI:  Discussed the general effects of drugs and alcohol on health and well-being. Provider gave patient information about the effects of chemical use on their health and well being. Recommendations:  Reduce and/or abstain from alcohol and cannabis use.      8. Records:   These were reviewed at time of assessment.   Information in this assessment was obtained from the medical record and provided by patient who is a fair historian.    Patient will have open access to their mental health medical record.    9.   Interactive Complexity: No      Provider Name/ Credentials:  Shawn Ullrich LICSW, [facility]  March 15, 2023

## 2023-03-15 ENCOUNTER — OFFICE VISIT (OUTPATIENT)
Dept: BEHAVIORAL HEALTH | Facility: CLINIC | Age: 33
End: 2023-03-15
Payer: COMMERCIAL

## 2023-03-15 DIAGNOSIS — F10.20 MODERATE ALCOHOL USE DISORDER (H): ICD-10-CM

## 2023-03-15 DIAGNOSIS — F41.1 GENERALIZED ANXIETY DISORDER: Primary | ICD-10-CM

## 2023-03-15 ASSESSMENT — COLUMBIA-SUICIDE SEVERITY RATING SCALE - C-SSRS
TOTAL  NUMBER OF ABORTED OR SELF INTERRUPTED ATTEMPTS LIFETIME: NO
1. HAVE YOU WISHED YOU WERE DEAD OR WISHED YOU COULD GO TO SLEEP AND NOT WAKE UP?: NO
6. HAVE YOU EVER DONE ANYTHING, STARTED TO DO ANYTHING, OR PREPARED TO DO ANYTHING TO END YOUR LIFE?: NO
ATTEMPT LIFETIME: NO
2. HAVE YOU ACTUALLY HAD ANY THOUGHTS OF KILLING YOURSELF?: NO
TOTAL  NUMBER OF INTERRUPTED ATTEMPTS LIFETIME: NO

## 2024-05-04 ENCOUNTER — HEALTH MAINTENANCE LETTER (OUTPATIENT)
Age: 34
End: 2024-05-04

## 2024-06-25 ENCOUNTER — OFFICE VISIT (OUTPATIENT)
Dept: FAMILY MEDICINE | Facility: CLINIC | Age: 34
End: 2024-06-25
Payer: COMMERCIAL

## 2024-06-25 VITALS
SYSTOLIC BLOOD PRESSURE: 129 MMHG | OXYGEN SATURATION: 97 % | BODY MASS INDEX: 23.36 KG/M2 | RESPIRATION RATE: 16 BRPM | TEMPERATURE: 97.8 F | DIASTOLIC BLOOD PRESSURE: 85 MMHG | HEIGHT: 72 IN | WEIGHT: 172.5 LBS | HEART RATE: 53 BPM

## 2024-06-25 DIAGNOSIS — Z13.228 SCREENING FOR METABOLIC DISORDER: ICD-10-CM

## 2024-06-25 DIAGNOSIS — T75.3XXA MOTION SICKNESS, INITIAL ENCOUNTER: ICD-10-CM

## 2024-06-25 DIAGNOSIS — L60.8 ACQUIRED DEFORMITY OF TOENAIL: ICD-10-CM

## 2024-06-25 DIAGNOSIS — Z00.00 ANNUAL PHYSICAL EXAM: Primary | ICD-10-CM

## 2024-06-25 PROCEDURE — 80053 COMPREHEN METABOLIC PANEL: CPT | Performed by: FAMILY MEDICINE

## 2024-06-25 RX ORDER — SCOLOPAMINE TRANSDERMAL SYSTEM 1 MG/1
1 PATCH, EXTENDED RELEASE TRANSDERMAL
Qty: 1 PATCH | Refills: 3 | Status: SHIPPED | OUTPATIENT
Start: 2024-06-25

## 2024-06-25 SDOH — HEALTH STABILITY: PHYSICAL HEALTH: ON AVERAGE, HOW MANY DAYS PER WEEK DO YOU ENGAGE IN MODERATE TO STRENUOUS EXERCISE (LIKE A BRISK WALK)?: 5 DAYS

## 2024-06-25 ASSESSMENT — SOCIAL DETERMINANTS OF HEALTH (SDOH): HOW OFTEN DO YOU GET TOGETHER WITH FRIENDS OR RELATIVES?: MORE THAN THREE TIMES A WEEK

## 2024-06-25 NOTE — NURSING NOTE
"34 year old  Chief Complaint   Patient presents with    Physical     Drinking -- jaundice in eyes, liver check. Genetic testing for CF, mom has MS. BP -- high in past and familial. Anti-nausea -- scopolamine patch in the past worked well, something for flying/being on a boat. Twitch -- body zap around sleep cycles. Mole/birthmark on left lower leg. Feet -- ingrown toenails, brother had surgery for fungus. Sleep issues -- will sleep for like 3 hours and wake. Able to fall back asleep. Notes he does drink a lot of liquor and water. Sleep study?       Blood pressure 129/85, pulse 53, temperature 97.8  F (36.6  C), temperature source Temporal, resp. rate 16, height 1.825 m (5' 11.85\"), weight 78.2 kg (172 lb 8 oz), SpO2 97%. Body mass index is 23.49 kg/m .  Patient Active Problem List   Diagnosis    Heavy alcohol use    Insomnia, unspecified type    Elevated bilirubin    Essential hypertension       Wt Readings from Last 2 Encounters:   06/25/24 78.2 kg (172 lb 8 oz)   01/27/23 78.9 kg (174 lb)     BP Readings from Last 3 Encounters:   06/25/24 129/85   01/27/23 (!) 148/92   04/01/22 138/79         Current Outpatient Medications   Medication Sig Dispense Refill    ASHWAGANDHA PO  (Patient not taking: Reported on 6/25/2024)      Cholecalciferol (VITAMIN D) 50 MCG (2000 UT) CAPS  (Patient not taking: Reported on 6/25/2024)      Multiple Vitamin (MULTIVITAMIN ADULT PO)  (Patient not taking: Reported on 6/25/2024)      scopolamine (TRANSDERM) 1 MG/3DAYS 72 hr patch Place 1 patch onto the skin every 72 hours (Patient not taking: Reported on 6/25/2024) 1 patch 3     No current facility-administered medications for this visit.       Social History     Tobacco Use    Smoking status: Some Days     Types: Cigarettes    Smokeless tobacco: Never    Tobacco comments:     Social smoker   Vaping Use    Vaping status: Never Used   Substance Use Topics    Alcohol use: Yes     Alcohol/week: 15.0 standard drinks of alcohol     Types: 3 " "Cans of beer, 12 Standard drinks or equivalent per week     Comment: 3 drinks per day. 3-4x/week    Drug use: Yes     Types: Marijuana     Comment: Occasionally (once/month) not smoking       Health Maintenance Due   Topic Date Due    COVID-19 Vaccine (5 - 2023-24 season) 09/01/2023    YEARLY PREVENTIVE VISIT  01/27/2024       No results found for: \"PAP\"      June 25, 2024 3:42 PM    "

## 2024-06-25 NOTE — PATIENT INSTRUCTIONS
"  ASSESSMENT/PLAN:    Annual Exam/Preventive Issues   - Labs: Reviewed normal lipids from the past 2 years. Will obtain Comp panel to assess LFTs.     - Immunizations: Up to date. Consider new Covid vaccine if surge comes again, e.g. in the winter time.     -Specific concerns:     High alcohol intake   -Discussed the problems in terms of sleep and blood pressure  - Suggested switching to sparkling water or hoppy water (e.g. [place] refresher)  - Suggested having nights of abstinence. May not notice a change in sleep until after about 2 weeks with no alcohol.       2. Question of cystic fibrosis risk for future offspring.  -Discussed that this would be low risk given only 1 distant relative in your family and none in Velma's family. Suggested looking into \"23ANDMe\" type of genetic testing which may help and be less extensive.     3. Toenail abnormalities, unclear if it's fungal.   - Discussed that if fungal, would definitely need to abstain from alcohol if taking oral medications  - Referral to Podiatry    Mckay Tan MD  Family Medicine and Sports Medicine  "

## 2024-06-25 NOTE — PROGRESS NOTES
"Preventive Care Visit  [facility]      Robson Samuel presents to [facility] today to have an annual exam.        ASSESSMENT/PLAN:    Annual Exam/Preventive Issues   - Labs: Reviewed normal lipids from the past 2 years. Will obtain Comp panel to assess LFTs.     - Immunizations: Up to date. Consider new Covid vaccine if surge comes again, e.g. in the winter time.     -Specific concerns:     High alcohol intake   -Discussed the problems in terms of sleep and blood pressure  - Suggested switching to sparkling water or hoppy water (e.g. [place] refresher)  - Suggested having nights of abstinence. May not notice a change in sleep until after about 2 weeks with no alcohol.       2. Question of cystic fibrosis risk for future offspring.  -Discussed that this would be low risk given only 1 distant relative in your family and none in Velma's family. Suggested looking into \"23ANDMe\" type of genetic testing which may help and be less extensive.     3. Toenail abnormalities, unclear if it's fungal.   - Discussed that if fungal, would definitely need to abstain from alcohol if taking oral medications  - Referral to Podiatry    Mckay Tan MD  Family Medicine and Sports Medicine      INTRODUCTION:   This is my first time meeting Jj. He's a very active 35 yo here for an annual preventive exam. Formerly saw Dr. Kellogg at our clinic    In addition to Preventive issues, he wishes to discuss:   -Poor sleep and high alcohol intake. Drinks nightly about 3 drinks. Does not get drunk.     Main medical issues have been:      Patient Active Problem List   Diagnosis    Heavy alcohol use    Insomnia, unspecified type    Elevated bilirubin    Essential hypertension       Current Medications include:   Current Outpatient Medications   Medication Sig Dispense Refill    scopolamine (TRANSDERM) 1 MG/3DAYS 72 hr patch Place 1 patch onto the skin every 72 hours (Patient not taking: Reported on 6/25/2024) 1 patch 3     No " Known Allergies    Past Surgical History:   Procedure Laterality Date    [place] Tooth Removal         Health Maintenance   Topic Date Due    COVID-19 Vaccine (5 - 2023-24 season) 09/01/2023    YEARLY PREVENTIVE VISIT  01/27/2024    INFLUENZA VACCINE (Season Ended) 09/01/2024    ADVANCE CARE PLANNING  01/28/2028    DTAP/TDAP/TD IMMUNIZATION (8 - Td or Tdap) 12/11/2028    HEPATITIS C SCREENING  Completed    HIV SCREENING  Completed    PHQ-2 (once per calendar year)  Completed    Pneumococcal Vaccine: Pediatrics (0 to 5 Years) and At-Risk Patients (6 to 64 Years)  Completed    IPV IMMUNIZATION  Completed    HEPATITIS B IMMUNIZATION  Completed    HPV IMMUNIZATION  Aged Out    MENINGITIS IMMUNIZATION  Aged Out    RSV MONOCLONAL ANTIBODY  Aged Out       Immunizations are as follows:      Immunization History   Administered Date(s) Administered    COVID-19 Bivalent 12+ (Pfizer) 10/31/2022    COVID-19 MONOVALENT 12+ (Pfizer) 03/27/2021, 04/13/2021, 10/20/2021    DTAP (<7y) 1992, 1992, 1992, 07/13/1993, 02/14/1997    HIB (PRP-T) 1992, 1992, 1992, 05/11/1993    Hepatitis A (ADULT 19+) 05/20/2013    Hepatitis B, Peds 07/13/1993, 09/09/1993, 02/08/1994    Influenza Vaccine 18-64 (Flublok) 12/11/2018, 12/18/2019    Influenza Vaccine >6 months,quad, PF 10/20/2021    Influenza,INJ,MDCK,PF,Quad >6mo(Flucelvax) 10/03/2020, 10/31/2022    MMR 05/11/1993, 02/14/1997, 10/30/2000    Meningococcal ACWY (Menactra ) 10/12/2006    Pneumococcal 20 valent Conjugate (Prevnar 20) 01/27/2023    Polio, Unspecified 1992, 1992, 07/13/1993, 02/14/1997, 10/12/2006    TD,PF 7+ (Tenivac) 12/28/2004, 02/02/2005    TDAP Vaccine (Boostrix) 12/11/2018    Tdap (Adult) Unspecified Formulation 10/11/2007    Typhoid Oral 05/20/2013    Varicella 11/21/1995, 10/11/2007                 6/25/2024   General Health   How would you rate your overall physical health? Good   Feel stress (tense, anxious, or unable to sleep)  Only a little      (!) STRESS CONCERN      6/25/2024   Nutrition   Three or more servings of calcium each day? Yes   Diet: Other   If other, please elaborate: skip breakfast unless i work out in the morning   How many servings of fruit and vegetables per day? 4 or more   How many sweetened beverages each day? 0-1            6/25/2024   Exercise   Days per week of moderate/strenous exercise 5 days            6/25/2024   Social Factors   Frequency of gathering with friends or relatives More than three times a week   Worry food won't last until get money to buy more No   Food not last or not have enough money for food? No   Do you have housing? (Housing is defined as stable permanent housing and does not include staying ouside in a car, in a tent, in an abandoned building, in an overnight shelter, or couch-surfing.) Yes   Are you worried about losing your housing? No   Lack of transportation? No   Unable to get utilities (heat,electricity)? No            6/25/2024   Dental   Dentist two times every year? Yes            6/25/2024   TB Screening   Were you born outside of the [place]? No            Today's PHQ-2 Score:       6/25/2024     3:31 PM   PHQ-2 ( 1999 Pfizer)   Q1: Little interest or pleasure in doing things 0   Q2: Feeling down, depressed or hopeless 0   PHQ-2 Score 0   Q1: Little interest or pleasure in doing things Not at all   Q2: Feeling down, depressed or hopeless Not at all   PHQ-2 Score 0           6/25/2024   Substance Use   Alcohol more than 3/day or more than 7/wk Yes   How often do you have a drink containing alcohol 4 or more times a week   How many alcohol drinks on typical day 3 or 4   How often do you have 5+ drinks at one occasion Weekly   Audit 2/3 Score 4   How often not able to stop drinking once started Never   How often failed to do what normally expected Less than monthly   How often needed first drink in am after a heavy drinking session Never   How often feeling of guilt or remorse after  "drinking Less than monthly   How often unable to remember what happened the night before Less than monthly   Have you or someone else been injured because of your drinking No   Has anyone been concerned or suggested you cut down on drinking Yes, during the last year   TOTAL SCORE - AUDIT 15   Do you use any other substances recreationally? (!) ALCOHOL    (!) CANNABIS PRODUCTS       Multiple values from one day are sorted in reverse-chronological order     Social History     Tobacco Use    Smoking status: Never    Smokeless tobacco: Never   Vaping Use    Vaping status: Never Used   Substance Use Topics    Alcohol use: Yes     Alcohol/week: 24.0 standard drinks of alcohol     Types: 3 Cans of beer, 21 Standard drinks or equivalent per week     Comment: 3 drinks per day.    Drug use: Yes     Types: Marijuana     Comment: Occasionally (once/month) not smoking           6/25/2024   STI Screening   New sexual partner(s) since last STI/HIV test? No        Social:  Social History     Social History Narrative    From [place].  Family is in [place] and supportive.     Lives with partner, talking about getting .     Works as a  at a software company, also teaches yoga/wellness.    Mom is a nephrologist for the [employer]. Travelled lots    Enjoys: Yoga, [employer] golf, home projects.                    Home situation:   Partner: -Velma.   STD concerns: None      ROS  PHQ-2 Score:         6/25/2024     3:31 PM 1/20/2023    10:26 AM   PHQ-2 ( 1999 Pfizer)   Q1: Little interest or pleasure in doing things 0 0   Q2: Feeling down, depressed or hopeless 0 0   PHQ-2 Score 0 0   Q1: Little interest or pleasure in doing things Not at all Not at all   Q2: Feeling down, depressed or hopeless Not at all Not at all   PHQ-2 Score 0 0       EXAM  /85 (BP Location: Left arm, Patient Position: Sitting, Cuff Size: Adult Large)   Pulse 53   Temp 97.8  F (36.6  C) (Temporal)   Resp 16   Ht 1.825 m (5' 11.85\")   Wt 78.2 kg " (172 lb 8 oz)   SpO2 97%   BMI 23.49 kg/m      GENERAL APPEARANCE:   He appears well and in no distress  HEENT:  eyes are normal.  Tympanic membranes are normal.  Neck is supple. No adenopathy, thyroid normal to palpation  RESP: Lungs clear to auscultation bilaterally.  Axillae: no palpable axillary masses or adenopathy  CV: regular rate and rhythm, normal S1 S2,  no murmur, no carotid bruits  ABDOMEN: nontender, without HSM or masses. Bowel sounds normal  : Deferred. Reported no concerns.   Rectal exam: deferred  MS:  full range of motion of all extremities although  his hips have less internal range of motion that would be expected for a thinly built flexible .   SKIN:   Left lower leg on the anterior medial aspect of the proximal tibia has a oval-shaped birthmark that has been unchanged for years. No suspicious lesions or rashes  NEURO: Normal strength and tone, sensory exam grossly normal  PSYCH: mentation and affect appear normal.   EXT: no peripheral edema    SEE TOP OF NOTE FOR ASSESSMENT AND PLAN      Mckay Tan MD  Family Medicine and Sports Medicine  [facility] Department of Family Medicine and Community Health

## 2024-06-26 LAB
ALBUMIN SERPL BCG-MCNC: 4.9 G/DL (ref 3.5–5.2)
ALP SERPL-CCNC: 52 U/L (ref 40–150)
ALT SERPL W P-5'-P-CCNC: 26 U/L (ref 0–70)
ANION GAP SERPL CALCULATED.3IONS-SCNC: 9 MMOL/L (ref 7–15)
AST SERPL W P-5'-P-CCNC: 30 U/L (ref 0–45)
BILIRUB SERPL-MCNC: 2.3 MG/DL
BUN SERPL-MCNC: 12.1 MG/DL (ref 6–20)
CALCIUM SERPL-MCNC: 9.5 MG/DL (ref 8.6–10)
CHLORIDE SERPL-SCNC: 99 MMOL/L (ref 98–107)
CREAT SERPL-MCNC: 0.95 MG/DL (ref 0.67–1.17)
DEPRECATED HCO3 PLAS-SCNC: 29 MMOL/L (ref 22–29)
EGFRCR SERPLBLD CKD-EPI 2021: >90 ML/MIN/1.73M2
GLUCOSE SERPL-MCNC: 89 MG/DL (ref 70–99)
POTASSIUM SERPL-SCNC: 4.2 MMOL/L (ref 3.4–5.3)
PROT SERPL-MCNC: 7.3 G/DL (ref 6.4–8.3)
SODIUM SERPL-SCNC: 137 MMOL/L (ref 135–145)

## 2024-06-26 ASSESSMENT — ANXIETY QUESTIONNAIRES
6. BECOMING EASILY ANNOYED OR IRRITABLE: NOT AT ALL
5. BEING SO RESTLESS THAT IT IS HARD TO SIT STILL: NOT AT ALL
7. FEELING AFRAID AS IF SOMETHING AWFUL MIGHT HAPPEN: NOT AT ALL
GAD7 TOTAL SCORE: 1
1. FEELING NERVOUS, ANXIOUS, OR ON EDGE: SEVERAL DAYS
3. WORRYING TOO MUCH ABOUT DIFFERENT THINGS: NOT AT ALL
IF YOU CHECKED OFF ANY PROBLEMS ON THIS QUESTIONNAIRE, HOW DIFFICULT HAVE THESE PROBLEMS MADE IT FOR YOU TO DO YOUR WORK, TAKE CARE OF THINGS AT HOME, OR GET ALONG WITH OTHER PEOPLE: NOT DIFFICULT AT ALL
2. NOT BEING ABLE TO STOP OR CONTROL WORRYING: NOT AT ALL
GAD7 TOTAL SCORE: 1

## 2024-06-26 ASSESSMENT — PATIENT HEALTH QUESTIONNAIRE - PHQ9
SUM OF ALL RESPONSES TO PHQ QUESTIONS 1-9: 1
5. POOR APPETITE OR OVEREATING: NOT AT ALL

## 2024-08-01 ENCOUNTER — OFFICE VISIT (OUTPATIENT)
Dept: PODIATRY | Facility: CLINIC | Age: 34
End: 2024-08-01
Payer: COMMERCIAL

## 2024-08-01 VITALS — HEART RATE: 52 BPM | SYSTOLIC BLOOD PRESSURE: 136 MMHG | DIASTOLIC BLOOD PRESSURE: 60 MMHG

## 2024-08-01 DIAGNOSIS — L60.8 ACQUIRED DEFORMITY OF TOENAIL: ICD-10-CM

## 2024-08-01 PROCEDURE — 99203 OFFICE O/P NEW LOW 30 MIN: CPT | Performed by: PODIATRIST

## 2024-08-01 NOTE — PATIENT INSTRUCTIONS
We wish you continued good healing. If you have any questions or concerns, please do not hesitate to contact us at  [phone]    [employer] (secure e-mail communication and access to your chart) to send a message or to make an appointment.    Please remember to call and schedule a follow up appointment if one was recommended at your earliest convenience.     PODIATRY CLINIC HOURS  TELEPHONE NUMBER    Dr. Alex STRICKLANDPTIFFANY FACFAS        Clinics:  [facility]   [place]  Tuesday 1PM-6PM  [place]  Wednesday 745AM-330PM  [place]  Monday 2nd,4th  830AM-4PM  Thursday/Friday 745AM-230PM     [place] APPOINTMENTS  [phone]    [place] APPOINTMENTS  [phone]          If you need a medication refill, please contact us you may need lab work and/or a follow up visit prior to your refill (i.e. Antifungal medications).  If MRI needed please call Imaging at [phone]   HOW DO I GET MY KNEE SCOOTER? Knee scooters can be picked up at ANY Medical Supply stores with your knee scooter Prescription.  OR  Bring your signed prescription to an [facility] showroom.   Set up an appointment for your custom Orthotics. Call any Orthotics locations call [phone]

## 2024-08-01 NOTE — LETTER
8/1/2024      Robson Samuel  [address] [place] [address]      Dear Colleague,    Thank you for referring your patient, Robson Samuel, to the [facility]. Please see a copy of my visit note below.    Patient seen today in consult from Dr. Tan and complains of abnormal toenails.  Points to distal portion of left hallux.  Has no pain or drainage.  Left third nail is also somewhat thick.  On the right foot points to hallux.  There is a crack in the nail and medially abnormal.  Also has abnormal nails on the right fourth and fifth toes.  Has no pain or drainage with any of these.  He played soccer for a number of years.  Also active on his feet.  Worked as a [employer] for 5 years and was also on her feet a lot then.  History of heavy alcohol use.    ROS: See above     No Known Allergies    Current Outpatient Medications   Medication Sig Dispense Refill     scopolamine (TRANSDERM) 1 MG/3DAYS 72 hr patch Place 1 patch onto the skin every 72 hours 1 patch 3       Patient Active Problem List   Diagnosis     Heavy alcohol use     Insomnia, unspecified type     Elevated bilirubin     Essential hypertension       Past Medical History:   Diagnosis Date     Elevated bilirubin 01/13/2022     Insomnia, unspecified type 01/13/2022     Stage 1 hypertension 04/01/2022    - Home BP average 138/79 over 11 days Jan-Feb 2022     Syncope 2018    One episode       Past Surgical History:   Procedure Laterality Date     [place] Tooth Removal         Family History   Problem Relation Age of Onset     Multiple Sclerosis Mother         Doing well     No Known Problems Father      Asthma Sister      Food Allergy Brother      Heart block Maternal Grandfather         pacemaker     Other - See Comments Maternal Grandfather         Gilbert's disease     Heart Disease Paternal Grandfather      Hypertension Paternal Grandfather      Diabetes Cousin         think type 1     Cystic Fibrosis Cousin      Coronary Artery  Disease No family hx of      Cerebrovascular Disease No family hx of      Prostate Cancer No family hx of      Colon Cancer No family hx of        Social History     Tobacco Use     Smoking status: Some Days     Types: Cigarettes     Smokeless tobacco: Never     Tobacco comments:     Social smoker   Substance Use Topics     Alcohol use: Yes     Alcohol/week: 15.0 standard drinks of alcohol     Types: 3 Cans of beer, 12 Standard drinks or equivalent per week     Comment: 3 drinks per day. 3-4x/week         /60   Pulse 52 .      VConstitutional/ general:  Pt is in no apparent distress, appears well-nourished.  Cooperative with history and physical exam.       Lungs:  Non labored breathing, non labored speech. No cough.  No audible wheezing. Even, quiet breathing.       Vascular:  Pedal pulses are palpable bilaterally for both the DP and PT arteries.  CFT < 3 sec.  No edema.  Pedal hair growth noted.     Neuro:  Alert and oriented x 3. Coordinated gait.  Light touch sensation is intact to the L4, L5, S1 distributions. No obvious deficits.  No evidence of neurological-based weakness, spasticity, or contracture in the lower extremities.    Derm: Normal texture and turgor.  No erythema, ecchymosis, or cyanosis.  No open lesions.  Left hallux nail normal  Except distal one third not attached.  There is debris under this.  The nailbed is healthy.  Has quite long third toe.  Nail thickened and discolored.  Right hallux nail has a longitudinal crack medial third.  The nail is somewhat thinned and shortened.  No subungual debris.  Right fourth and fifth nail somewhat thickened and discolored with subungual debris.    Musculoskeletal:    Lower extremity muscle strength is normal.  Patient is ambulatory without an assistive device or brace.  No gross deformities.  MS 5/5 all compartments.  Normal arch with weightbearing.         A/P bilateral nail deformities    Discussed along third toe can cause repetitive trauma on  nail.  Will keep this short and watch.  Explained left hallux nail not attached anymore.  Discussed after detached for 6 months will not reattach in future.  Will just keep short and watch.  Right hallux nail appears upon trauma has caused fissure tear in the past.  Will just trimmed short and watch.  Right fourth and fifth nails perhaps mycotic.  Discussed this is cosmetic problem.  He can continue to watch.  Since  heavy alcohol use if you would like to treat these discussed topical.  He will just watch for now.  RTC as needed.  Thank you for allowing me participate in the care of this patient.        Alex Damian DPM, FACFAS                    Again, thank you for allowing me to participate in the care of your patient.        Sincerely,        Alex Damian DPM

## 2024-08-01 NOTE — PROGRESS NOTES
Patient seen today in consult from Dr. Tan and complains of abnormal toenails.  Points to distal portion of left hallux.  Has no pain or drainage.  Left third nail is also somewhat thick.  On the right foot points to hallux.  There is a crack in the nail and medially abnormal.  Also has abnormal nails on the right fourth and fifth toes.  Has no pain or drainage with any of these.  He played soccer for a number of years.  Also active on his feet.  Worked as a [employer] for 5 years and was also on her feet a lot then.  History of heavy alcohol use.    ROS: See above     No Known Allergies    Current Outpatient Medications   Medication Sig Dispense Refill    scopolamine (TRANSDERM) 1 MG/3DAYS 72 hr patch Place 1 patch onto the skin every 72 hours 1 patch 3       Patient Active Problem List   Diagnosis    Heavy alcohol use    Insomnia, unspecified type    Elevated bilirubin    Essential hypertension       Past Medical History:   Diagnosis Date    Elevated bilirubin 01/13/2022    Insomnia, unspecified type 01/13/2022    Stage 1 hypertension 04/01/2022    - Home BP average 138/79 over 11 days Jan-Feb 2022    Syncope 2018    One episode       Past Surgical History:   Procedure Laterality Date    [place] Tooth Removal         Family History   Problem Relation Age of Onset    Multiple Sclerosis Mother         Doing well    No Known Problems Father     Asthma Sister     Food Allergy Brother     Heart block Maternal Grandfather         pacemaker    Other - See Comments Maternal Grandfather         Gilbert's disease    Heart Disease Paternal Grandfather     Hypertension Paternal Grandfather     Diabetes Cousin         think type 1    Cystic Fibrosis Cousin     Coronary Artery Disease No family hx of     Cerebrovascular Disease No family hx of     Prostate Cancer No family hx of     Colon Cancer No family hx of        Social History     Tobacco Use    Smoking status: Some Days     Types: Cigarettes    Smokeless tobacco: Never     Tobacco comments:     Social smoker   Substance Use Topics    Alcohol use: Yes     Alcohol/week: 15.0 standard drinks of alcohol     Types: 3 Cans of beer, 12 Standard drinks or equivalent per week     Comment: 3 drinks per day. 3-4x/week         /60   Pulse 52 .      VConstitutional/ general:  Pt is in no apparent distress, appears well-nourished.  Cooperative with history and physical exam.       Lungs:  Non labored breathing, non labored speech. No cough.  No audible wheezing. Even, quiet breathing.       Vascular:  Pedal pulses are palpable bilaterally for both the DP and PT arteries.  CFT < 3 sec.  No edema.  Pedal hair growth noted.     Neuro:  Alert and oriented x 3. Coordinated gait.  Light touch sensation is intact to the L4, L5, S1 distributions. No obvious deficits.  No evidence of neurological-based weakness, spasticity, or contracture in the lower extremities.    Derm: Normal texture and turgor.  No erythema, ecchymosis, or cyanosis.  No open lesions.  Left hallux nail normal  Except distal one third not attached.  There is debris under this.  The nailbed is healthy.  Has quite long third toe.  Nail thickened and discolored.  Right hallux nail has a longitudinal crack medial third.  The nail is somewhat thinned and shortened.  No subungual debris.  Right fourth and fifth nail somewhat thickened and discolored with subungual debris.    Musculoskeletal:    Lower extremity muscle strength is normal.  Patient is ambulatory without an assistive device or brace.  No gross deformities.  MS 5/5 all compartments.  Normal arch with weightbearing.         A/P bilateral nail deformities    Discussed along third toe can cause repetitive trauma on nail.  Will keep this short and watch.  Explained left hallux nail not attached anymore.  Discussed after detached for 6 months will not reattach in future.  Will just keep short and watch.  Right hallux nail appears upon trauma has caused fissure tear in the past.   Will just trimmed short and watch.  Right fourth and fifth nails perhaps mycotic.  Discussed this is cosmetic problem.  He can continue to watch.  Since  heavy alcohol use if you would like to treat these discussed topical.  He will just watch for now.  RTC as needed.  Thank you for allowing me participate in the care of this patient.        Alex Damian, KRIS, FACFAS

## 2024-11-15 ENCOUNTER — APPOINTMENT (OUTPATIENT)
Dept: URBAN - METROPOLITAN AREA CLINIC 254 | Age: 34
Setting detail: DERMATOLOGY
End: 2024-11-17

## 2024-11-15 VITALS — WEIGHT: 175 LBS | HEIGHT: 72 IN

## 2024-11-15 DIAGNOSIS — Z71.89 OTHER SPECIFIED COUNSELING: ICD-10-CM

## 2024-11-15 DIAGNOSIS — D22 MELANOCYTIC NEVI: ICD-10-CM

## 2024-11-15 DIAGNOSIS — D18.0 HEMANGIOMA: ICD-10-CM

## 2024-11-15 DIAGNOSIS — B07.8 OTHER VIRAL WARTS: ICD-10-CM

## 2024-11-15 PROBLEM — D22.5 MELANOCYTIC NEVI OF TRUNK: Status: ACTIVE | Noted: 2024-11-15

## 2024-11-15 PROBLEM — D18.01 HEMANGIOMA OF SKIN AND SUBCUTANEOUS TISSUE: Status: ACTIVE | Noted: 2024-11-15

## 2024-11-15 PROBLEM — D22.72 MELANOCYTIC NEVI OF LEFT LOWER LIMB, INCLUDING HIP: Status: ACTIVE | Noted: 2024-11-15

## 2024-11-15 PROCEDURE — OTHER PRESCRIPTION: OTHER

## 2024-11-15 PROCEDURE — OTHER ADDITIONAL NOTES: OTHER

## 2024-11-15 PROCEDURE — OTHER MIPS QUALITY: OTHER

## 2024-11-15 PROCEDURE — OTHER DEFER: OTHER

## 2024-11-15 PROCEDURE — OTHER COUNSELING: OTHER

## 2024-11-15 PROCEDURE — 99203 OFFICE O/P NEW LOW 30 MIN: CPT

## 2024-11-15 RX ORDER — FLUOROURACIL 5 MG/G
5% CREAM TOPICAL QD
Qty: 40 | Refills: 1 | Status: ERX | COMMUNITY
Start: 2024-11-15

## 2024-11-15 ASSESSMENT — LOCATION DETAILED DESCRIPTION DERM
LOCATION DETAILED: LEFT PROXIMAL PRETIBIAL REGION
LOCATION DETAILED: EPIGASTRIC SKIN
LOCATION DETAILED: PERIUNGUAL SKIN LEFT RING FINGER
LOCATION DETAILED: LEFT SUPERIOR UPPER BACK
LOCATION DETAILED: LEFT MID ULNAR DORSAL MIDDLE FINGER
LOCATION DETAILED: LEFT RING PROXIMAL INTERPHALANGEAL JOINT
LOCATION DETAILED: RIGHT MID-UPPER BACK
LOCATION DETAILED: RIGHT DORSAL MIDDLE METACARPOPHALANGEAL JOINT

## 2024-11-15 ASSESSMENT — LOCATION ZONE DERM
LOCATION ZONE: TRUNK
LOCATION ZONE: HAND
LOCATION ZONE: FINGER
LOCATION ZONE: LEG

## 2024-11-15 ASSESSMENT — LOCATION SIMPLE DESCRIPTION DERM
LOCATION SIMPLE: LEFT MIDDLE FINGER
LOCATION SIMPLE: RIGHT HAND
LOCATION SIMPLE: RIGHT UPPER BACK
LOCATION SIMPLE: LEFT UPPER BACK
LOCATION SIMPLE: LEFT RING FINGER
LOCATION SIMPLE: LEFT PRETIBIAL REGION
LOCATION SIMPLE: ABDOMEN

## 2024-11-15 NOTE — PROCEDURE: DEFER
Introduction Text (Please End With A Colon): The following procedure was deferred:
Size Of Lesion In Cm (Optional): 0
Detail Level: Detailed
Other Procedure: cryotherapy and canthacur
Instructions (Optional): Provided insurance codes. Pt may RTC for this as desired.

## 2024-11-15 NOTE — HPI: WART (PATIENT REPORTED)
Where Is Your Wart Located?: Left middle and ring finger
List Over The Counter Wart Treatments You Are Currently Using (Separate Each Name With A Comma):: Freezing

## 2024-11-15 NOTE — PROCEDURE: ADDITIONAL NOTES
Additional Notes: Ok to continue wart OTC freezing, advised to do once a week or less frequently if still healing.\\nWill send wart peel Rx to [facility], out of pocket cost discussed.\\nRTC should treatment regimen not improve lesion or if desiring in office treatment. Provided codes for in-office destruction procedure so he can check with insurance prior to proceeding. Also discussed self-pay option if not well covered. 
Detail Level: Simple
Render Risk Assessment In Note?: no

## 2024-11-15 NOTE — PROCEDURE: COUNSELING
Patient Specific Counseling (Will Not Stick From Patient To Patient): - Discussed and recommended liquid nitrogen cryosurgery and Canthacur application. \\n- Discussed the expectations of blistering and pain with Canthacur. \\n- Discussed the intensity of pain can variable from person to person and dependant on the body location and number of warts treated. Ibuprofen can be taken as needed for pain.
Detail Level: Detailed
Detail Level: Generalized
Detail Level: Zone
Detail Level: Simple

## 2024-11-18 RX ORDER — FLUOROURACIL 5 MG/G
5% CREAM TOPICAL QD
Qty: 40 | Refills: 1 | Status: ERX

## 2025-07-05 SDOH — HEALTH STABILITY: PHYSICAL HEALTH: ON AVERAGE, HOW MANY DAYS PER WEEK DO YOU ENGAGE IN MODERATE TO STRENUOUS EXERCISE (LIKE A BRISK WALK)?: 2 DAYS

## 2025-07-05 SDOH — HEALTH STABILITY: PHYSICAL HEALTH: ON AVERAGE, HOW MANY MINUTES DO YOU ENGAGE IN EXERCISE AT THIS LEVEL?: 60 MIN

## 2025-07-05 ASSESSMENT — SOCIAL DETERMINANTS OF HEALTH (SDOH): HOW OFTEN DO YOU GET TOGETHER WITH FRIENDS OR RELATIVES?: THREE TIMES A WEEK

## 2025-07-09 ENCOUNTER — OFFICE VISIT (OUTPATIENT)
Dept: FAMILY MEDICINE | Facility: CLINIC | Age: 35
End: 2025-07-09
Payer: COMMERCIAL

## 2025-07-09 VITALS
RESPIRATION RATE: 16 BRPM | HEART RATE: 50 BPM | OXYGEN SATURATION: 98 % | DIASTOLIC BLOOD PRESSURE: 82 MMHG | SYSTOLIC BLOOD PRESSURE: 117 MMHG | HEIGHT: 73 IN | WEIGHT: 173.75 LBS | TEMPERATURE: 97.9 F | BODY MASS INDEX: 23.03 KG/M2

## 2025-07-09 DIAGNOSIS — I10 ESSENTIAL HYPERTENSION: Primary | ICD-10-CM

## 2025-07-09 LAB
ALBUMIN SERPL BCG-MCNC: 4.6 G/DL (ref 3.5–5.2)
ALP SERPL-CCNC: 46 U/L (ref 40–150)
ALT SERPL W P-5'-P-CCNC: 26 U/L (ref 0–70)
ANION GAP SERPL CALCULATED.3IONS-SCNC: 10 MMOL/L (ref 7–15)
AST SERPL W P-5'-P-CCNC: 33 U/L (ref 0–45)
BASO+EOS+MONOS # BLD AUTO: 0.3 10E3/UL (ref 0–2.2)
BASO+EOS+MONOS NFR BLD AUTO: 9 %
BILIRUB SERPL-MCNC: 1.5 MG/DL
BILIRUB SERPL-MCNC: 1.5 MG/DL
BILIRUBIN DIRECT (ROCHE PRO & PURE): 0.51 MG/DL (ref 0–0.45)
BUN SERPL-MCNC: 9.8 MG/DL (ref 6–20)
CALCIUM SERPL-MCNC: 9.6 MG/DL (ref 8.8–10.4)
CHLORIDE SERPL-SCNC: 100 MMOL/L (ref 98–107)
CHOLEST SERPL-MCNC: 227 MG/DL
CREAT SERPL-MCNC: 1.06 MG/DL (ref 0.67–1.17)
EGFRCR SERPLBLD CKD-EPI 2021: >90 ML/MIN/1.73M2
ERYTHROCYTE [DISTWIDTH] IN BLOOD BY AUTOMATED COUNT: 12 % (ref 10–15)
FASTING STATUS PATIENT QL REPORTED: YES
FASTING STATUS PATIENT QL REPORTED: YES
GLUCOSE SERPL-MCNC: 86 MG/DL (ref 70–99)
HCO3 SERPL-SCNC: 28 MMOL/L (ref 22–29)
HCT VFR BLD AUTO: 44.3 % (ref 40–53)
HDLC SERPL-MCNC: 71 MG/DL
HGB BLD-MCNC: 15.9 G/DL (ref 13.3–17.7)
LDLC SERPL CALC-MCNC: 129 MG/DL
LYMPHOCYTES # BLD AUTO: 1.4 10E3/UL (ref 0.8–5.3)
LYMPHOCYTES NFR BLD AUTO: 40 %
MCH RBC QN AUTO: 32.4 PG (ref 26.5–33)
MCHC RBC AUTO-ENTMCNC: 35.9 G/DL (ref 31.5–36.5)
MCV RBC AUTO: 90 FL (ref 78–100)
NEUTROPHILS # BLD AUTO: 1.7 10E3/UL (ref 1.6–8.3)
NEUTROPHILS NFR BLD AUTO: 51 %
NONHDLC SERPL-MCNC: 156 MG/DL
PLATELET # BLD AUTO: 213 10E3/UL (ref 150–450)
POTASSIUM SERPL-SCNC: 4.8 MMOL/L (ref 3.4–5.3)
PROT SERPL-MCNC: 6.9 G/DL (ref 6.4–8.3)
RBC # BLD AUTO: 4.9 10E6/UL (ref 4.4–5.9)
SODIUM SERPL-SCNC: 138 MMOL/L (ref 135–145)
TRIGL SERPL-MCNC: 134 MG/DL
WBC # BLD AUTO: 3.4 10E3/UL (ref 4–11)

## 2025-07-09 PROCEDURE — 80053 COMPREHEN METABOLIC PANEL: CPT | Performed by: FAMILY MEDICINE

## 2025-07-09 PROCEDURE — 80061 LIPID PANEL: CPT | Performed by: FAMILY MEDICINE

## 2025-07-09 NOTE — PROGRESS NOTES
Preventive Care Visit  [facility]  Mckay Tan MD, Family Medicine  Jul 9, 2025  IMPRESSION  Healthy 34 yo who still consumes a high amount of alcohol.     ASSESSMENT/PLAN:    Annual Exam/Preventive Issues   - Labs: Check lipids, CBC, comp panel (which will include LFTs) and direct and indirect Bilirubin  - Immunizations: Up to date  - Other recommendations:     -Specific concerns:     High alcohol intake  We again discussed the risks, both acute and chronic as well as the risk to social interactions.   Discussed that the medication Naltrexone may be helpful. He'll let me know if he wants to try it.   History of high bilirubin  Check LFTs and bilirubin types  Consider ultrasound  Concern of cystic fibrosis in offspring. States that his mom's niece has CF. Nobody else in family has it. Nobody in partner, Velma's family has it.   Discussed that his risk of passing anything to offspring is very low  To better assess risk, can either call genetic counseling and/or do online searching       Mckay Tan MD  Family Medicine and Sports Medicine      INTRODUCTION:     Jj is here for an annual preventive exam.     Mentions immediately that he still drinks too much. Drinks nightly. Mostly beers, 3-4/night. No drinking in mornings. No cravings.   He has decreased drinking before bed. States his fiancee also drinks nightly.   He feels it's under control and he can cut down any time. He's not worried, but states that his fiancee would like him to cut down especially if they have children.     Stressed at work ().       No current outpatient medications on file.       Patient Active Problem List   Diagnosis    Heavy alcohol use    Insomnia, unspecified type    Elevated bilirubin    Essential hypertension       Social History     Social History Narrative    From [place].  Family is in [place] and supportive.     Lives with partner, talking about getting .     Works as a  at  a software company, also teaches yoga/wellness.    Mom is a nephrologist for the [employer]. Travelled lots    Enjoys: Yoga, Frisbee golf, home projects.            7/5/2025   General Health   How would you rate your overall physical health? Good   Feel stress (tense, anxious, or unable to sleep) Rather much   (!) STRESS CONCERN      7/5/2025   Nutrition   Three or more servings of calcium each day? 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   How many servings of fruit and vegetables per day? (!) 2-3   How many sweetened beverages each day? 0-1         7/5/2025   Exercise   Days per week of moderate/strenous exercise 2 days   Average minutes spent exercising at this level 60 min   (!) EXERCISE CONCERN      7/5/2025   Social Factors   Frequency of gathering with friends or relatives Three times a week   Worry food won't last until get money to buy more No   Food not last or not have enough money for food? No   Do you have housing? (Housing is defined as stable permanent housing and does not include staying outside in a car, in a tent, in an abandoned building, in an overnight shelter, or couch-surfing.) Yes   Are you worried about losing your housing? No   Lack of transportation? No   Unable to get utilities (heat,electricity)? No         7/5/2025   Dental   Dentist two times every year? Yes         Today's PHQ-2 Score:       7/8/2025     1:22 PM   PHQ-2 ( 1999 [employer])   Q1: Little interest or pleasure in doing things 0   Q2: Feeling down, depressed or hopeless 0   PHQ-2 Score 0    Q1: Little interest or pleasure in doing things Not at all   Q2: Feeling down, depressed or hopeless Not at all   PHQ-2 Score 0       Patient-reported           7/5/2025   Substance Use   Alcohol more than 3/day or more than 7/wk Yes   How often do you have a drink containing alcohol 4 or more  "times a week   How many alcohol drinks on typical day 3 or 4   How often do you have 5+ drinks at one occasion Weekly   Audit 2/3 Score 4   How often not able to stop drinking once started Never   How often failed to do what normally expected Never   How often needed first drink in am after a heavy drinking session Never   How often feeling of guilt or remorse after drinking Less than monthly   How often unable to remember what happened the night before Never   Have you or someone else been injured because of your drinking No   Has anyone been concerned or suggested you cut down on drinking Yes, during the last year   TOTAL SCORE - AUDIT 13   Do you use any other substances recreationally? No     Social History     Tobacco Use    Smoking status: Some Days     Types: Cigarettes    Smokeless tobacco: Never    Tobacco comments:     Social smoker -- maybe 5 a year?   Vaping Use    Vaping status: Never Used   Substance Use Topics    Alcohol use: Yes     Alcohol/week: 15.0 standard drinks of alcohol     Types: 3 Cans of beer, 12 Standard drinks or equivalent per week     Comment: 3 drinks per day. 3-4x/week    Drug use: Yes     Types: Marijuana     Comment: Occasionally (once/month) not smoking           7/5/2025   STI Screening   New sexual partner(s) since last STI/HIV test? No         7/5/2025   Contraception/Family Planning   Questions about contraception or family planning (!) YES. See above about CF.         Reviewed and updated as needed this visit by Provider   Tobacco  Allergies  Meds  Problems  Med Hx  Surg Hx  Fam Hx               Objective    Exam  /82 (BP Location: Left arm, Patient Position: Sitting, Cuff Size: Adult Regular)   Pulse 50   Temp 97.9  F (36.6  C) (Temporal)   Resp 16   Ht 1.845 m (6' 0.64\")   Wt 78.8 kg (173 lb 12 oz)   SpO2 98%   BMI 23.15 kg/m     Estimated body mass index is 23.15 kg/m  as calculated from the following:    Height as of this encounter: 1.845 m (6' " "0.64\").    Weight as of this encounter: 78.8 kg (173 lb 12 oz).    Physical Exam  GENERAL:   He appears is a healthy 35-year-old male.  EYES: Eyes grossly normal to inspection, sclerae normal  HENT: ear canals and TM's normal  NECK: no adenopathy, no asymmetry, masses, or scars  RESP: lungs clear to auscultation - no rales, rhonchi or wheezes  CV: regular rate and rhythm, normal S1 S2, no S3 or S4, no murmur, click or rub, no peripheral edema  ABDOMEN: soft, nontender, no hepatosplenomegaly, no masses and bowel sounds normal  MS: no gross musculoskeletal defects noted, no edema  SKIN: no suspicious lesions or rashes  NEURO: Normal strength and tone, mentation intact and speech normal  PSYCH: mentation appears normal, affect normal/bright        Signed Electronically by: Mckay Tan MD    "

## 2025-07-09 NOTE — PATIENT INSTRUCTIONS
ASSESSMENT/PLAN:    Annual Exam/Preventive Issues   - Labs: Check lipids, CBC, comp panel (which will include LFTs) and direct and indirect Bilirubin  - Immunizations: Up to date  - Other recommendations:     -Specific concerns:     High alcohol intake  We again discussed the risks, both acute and chronic as well as the risk to social interactions.   Discussed that the medication Naltrexone may be helpful. He'll let me know if he wants to try it.   History of high bilirubin  Check LFTs and bilirubin types  Consider ultrasound  Concern of cystic fibrosis in offspring. States that his mom's niece has CF. Nobody else in family has it. Nobody in partner, Velma's family has it.   Discussed that his risk of passing anything to offspring is very low  To better assess risk, can either call genetic counseling and/or do online searching       Mckay Tan MD  Family Medicine and Sports Medicine

## 2025-07-09 NOTE — NURSING NOTE
"35 year old  Chief Complaint   Patient presents with    Physical     Drinking -- fiance and he are trying to cut down on drinking, planning to start family. LFTs? Ties into sleep, twitching (calmer over past year), ivette is concerned for jaundice. Mat side with CF, testing?       Blood pressure 117/82, pulse 50, temperature 97.9  F (36.6  C), temperature source Temporal, resp. rate 16, height 1.845 m (6' 0.64\"), weight 78.8 kg (173 lb 12 oz), SpO2 98%. Body mass index is 23.15 kg/m .  Patient Active Problem List   Diagnosis    Heavy alcohol use    Insomnia, unspecified type    Elevated bilirubin    Essential hypertension       Wt Readings from Last 2 Encounters:   07/09/25 78.8 kg (173 lb 12 oz)   06/25/24 78.2 kg (172 lb 8 oz)     BP Readings from Last 3 Encounters:   07/09/25 117/82   08/01/24 136/60   06/25/24 129/85         Current Outpatient Medications   Medication Sig Dispense Refill    scopolamine (TRANSDERM) 1 MG/3DAYS 72 hr patch Place 1 patch onto the skin every 72 hours 1 patch 3     No current facility-administered medications for this visit.       Social History     Tobacco Use    Smoking status: Some Days     Types: Cigarettes    Smokeless tobacco: Never    Tobacco comments:     Social smoker -- maybe 5 a year?   Vaping Use    Vaping status: Never Used   Substance Use Topics    Alcohol use: Yes     Alcohol/week: 15.0 standard drinks of alcohol     Types: 3 Cans of beer, 12 Standard drinks or equivalent per week     Comment: 3 drinks per day. 3-4x/week    Drug use: Yes     Types: Marijuana     Comment: Occasionally (once/month) not smoking       Health Maintenance Due   Topic Date Due    NICOTINE/TOBACCO CESSATION COUNSELING Q 1 YR  Never done    BMP  06/25/2025    YEARLY PREVENTIVE VISIT  06/25/2025       No results found for: \"PAP\"      July 9, 2025 8:02 AM    "

## 2025-07-10 DIAGNOSIS — R17 ELEVATED BILIRUBIN: Primary | ICD-10-CM

## 2025-07-22 ENCOUNTER — ANCILLARY PROCEDURE (OUTPATIENT)
Dept: ULTRASOUND IMAGING | Facility: CLINIC | Age: 35
End: 2025-07-22
Attending: FAMILY MEDICINE
Payer: COMMERCIAL

## 2025-07-22 DIAGNOSIS — R17 ELEVATED BILIRUBIN: Primary | ICD-10-CM

## 2025-07-22 DIAGNOSIS — R17 ELEVATED BILIRUBIN: ICD-10-CM

## 2025-07-22 PROCEDURE — 76705 ECHO EXAM OF ABDOMEN: CPT | Mod: GC | Performed by: RADIOLOGY
